# Patient Record
Sex: MALE | Race: WHITE | NOT HISPANIC OR LATINO | Employment: OTHER | URBAN - NONMETROPOLITAN AREA
[De-identification: names, ages, dates, MRNs, and addresses within clinical notes are randomized per-mention and may not be internally consistent; named-entity substitution may affect disease eponyms.]

---

## 2019-01-14 ENCOUNTER — OFFICE VISIT (OUTPATIENT)
Dept: UROLOGY | Facility: CLINIC | Age: 59
End: 2019-01-14

## 2019-01-14 VITALS — BODY MASS INDEX: 39.55 KG/M2 | WEIGHT: 252 LBS | HEIGHT: 67 IN

## 2019-01-14 DIAGNOSIS — N20.0 RENAL CALCULUS: ICD-10-CM

## 2019-01-14 DIAGNOSIS — K40.30 INGUINAL HERNIA OF RIGHT SIDE WITH OBSTRUCTION AND WITHOUT GANGRENE: ICD-10-CM

## 2019-01-14 DIAGNOSIS — N52.02 CORPORO-VENOUS OCCLUSIVE ERECTILE DYSFUNCTION: ICD-10-CM

## 2019-01-14 DIAGNOSIS — N42.9 DISORDER OF PROSTATE: ICD-10-CM

## 2019-01-14 DIAGNOSIS — R33.9 INCOMPLETE BLADDER EMPTYING: Primary | ICD-10-CM

## 2019-01-14 LAB — PSA SERPL-MCNC: 0.53 NG/ML (ref 0–4)

## 2019-01-14 PROCEDURE — 84153 ASSAY OF PSA TOTAL: CPT | Performed by: UROLOGY

## 2019-01-14 PROCEDURE — 99204 OFFICE O/P NEW MOD 45 MIN: CPT | Performed by: UROLOGY

## 2019-01-14 RX ORDER — SILDENAFIL CITRATE 20 MG/1
TABLET ORAL
Qty: 30 TABLET | Refills: 11 | Status: SHIPPED | OUTPATIENT
Start: 2019-01-14 | End: 2022-10-26 | Stop reason: HOSPADM

## 2019-01-14 NOTE — PROGRESS NOTES
Chief Complaint:          Chief Complaint   Patient presents with   • Lower abdominal pain and pressure       HPI:   58 y.o. male.  58-year-old white male accompanied by his wife.  He has lower abdominal pain and pressure he brought a disc which I personally reviewed showing bilateral punctate nephrolithiasis, in the upper middle lower pole calyces.  He has occasional right lower quadrant pain, no nausea, no vomiting, no fevers, no chills no medications.  He sees Dr. Romero.  He's had a cholecystectomy iand 3 hernias on the right all unsuccessful.  He's having significant right lower quadrant pain and wears a truss has erectile dysfunction and I gave him a prescription for Viagra after discussing the character and quality of his erections.  He inquired whether should have his hernia repaired for the fourth time and I indicated as long as he continues his very heavy job as a  he is at significant risk for recurrence    Past Medical History:        Past Medical History:   Diagnosis Date   • Kidney stone          Current Meds:     No current outpatient medications on file.     No current facility-administered medications for this visit.         Allergies:      No Known Allergies     Past Surgical History:     Past Surgical History:   Procedure Laterality Date   • CHOLECYSTECTOMY  20 years   • HERNIA REPAIR     • KIDNEY STONE SURGERY           Social History:     Social History     Socioeconomic History   • Marital status:      Spouse name: Not on file   • Number of children: Not on file   • Years of education: Not on file   • Highest education level: Not on file   Social Needs   • Financial resource strain: Not on file   • Food insecurity - worry: Not on file   • Food insecurity - inability: Not on file   • Transportation needs - medical: Not on file   • Transportation needs - non-medical: Not on file   Occupational History   • Not on file   Tobacco Use   • Smoking status: Former Smoker      Packs/day: 2.00     Types: Cigarettes   • Smokeless tobacco: Former User     Types: Snuff   Substance and Sexual Activity   • Alcohol use: No     Frequency: Never   • Drug use: No   • Sexual activity: Yes     Partners: Female     Birth control/protection: None   Other Topics Concern   • Not on file   Social History Narrative   • Not on file       Family History:     Family History   Problem Relation Age of Onset   • Cancer Father        Review of Systems:     Review of Systems   Constitutional: Negative.    HENT: Negative.    Eyes: Negative.    Respiratory: Negative.    Cardiovascular: Negative.    Gastrointestinal: Negative.    Endocrine: Negative.    Musculoskeletal: Negative.    Allergic/Immunologic: Negative.    Neurological: Negative.    Hematological: Negative.    Psychiatric/Behavioral: Negative.        Physical Exam:     Physical Exam   Constitutional: He is oriented to person, place, and time. He appears well-developed and well-nourished.   HENT:   Head: Normocephalic and atraumatic.   Eyes: Conjunctivae and EOM are normal. Pupils are equal, round, and reactive to light.   Neck: Normal range of motion.   Cardiovascular: Normal rate, regular rhythm, normal heart sounds and intact distal pulses.   Pulmonary/Chest: Effort normal and breath sounds normal.   Abdominal: Soft. Bowel sounds are normal.   Genitourinary:   Genitourinary Comments: Soft nontender abdomen with no organomegaly, rigidity, or tenderness.  He has normal external genitalia and uncircumcised phallus with a freely movable foreskin bilaterally descended testes without masses there is a right direct inguinal hernia, and no adenopathy or abnormalities he had good rectal tone and a large smooth firm prostate.  There is no nodularity or any suspicious rectal abnormalities     Musculoskeletal: Normal range of motion.   Neurological: He is alert and oriented to person, place, and time. He has normal reflexes.   Skin: Skin is warm and dry.    Psychiatric: He has a normal mood and affect. His behavior is normal. Judgment and thought content normal.   Nursing note and vitals reviewed.      I have reviewed the following portions of the patient's history: allergies, current medications, past family history, past medical history, past social history, past surgical history, problem list and ROS and confirm it's accurate.      Procedure:       Assessment/Plan:   Nephrolithiasis-he has bilateral punctate stones I recommended increased fluids as he does not have a great deal of fluid intake during the day  Erectile dysfunction-we discussed the anatomy and physiology of the penis and the endothelium.  We discussed the various forms of erectile dysfunction including peripheral vascular occlusive disease, postoperative, secondary to radiation treatments of the prostate, and arterial inflow.  We discussed the various treatment options available including oral medication and its various forms.  We discussed the use of both generic and non-generic Viagra.  We discussed Cialis and a longer half-life of 17 hours as well as the other 2 medications.  We discussed cost involved with this including the fact that the generic is much cheaper but is taken has multiple pills because they are 20 mg dosages.  We did discuss the other alternatives including Penile injections, vacuum erection devices and surgical intervention reserved for only the most severe cases.  We discussed the need for testosterone in about 20% of cases of erectile dysfunction.  He was given a prescription for Viagra  Recurrent right direct inguinal hernia-currently wearing a truss     Patient's Body mass index is 39.46 kg/m². BMI is above normal parameters. Recommendations include: educational material.          This document has been electronically signed by KAMALJIT DELACRUZ MD January 14, 2019 3:40 PM

## 2019-01-15 ENCOUNTER — TELEPHONE (OUTPATIENT)
Dept: UROLOGY | Facility: CLINIC | Age: 59
End: 2019-01-15

## 2019-01-15 PROBLEM — N20.0 RENAL CALCULUS: Status: ACTIVE | Noted: 2019-01-15

## 2019-01-15 PROBLEM — K40.30 INGUINAL HERNIA OF RIGHT SIDE WITH OBSTRUCTION AND WITHOUT GANGRENE: Status: ACTIVE | Noted: 2019-01-15

## 2019-01-15 PROBLEM — N52.02 CORPORO-VENOUS OCCLUSIVE ERECTILE DYSFUNCTION: Status: ACTIVE | Noted: 2019-01-15

## 2019-07-05 ENCOUNTER — OFFICE VISIT (OUTPATIENT)
Dept: UROLOGY | Facility: CLINIC | Age: 59
End: 2019-07-05

## 2019-07-05 VITALS — BODY MASS INDEX: 40.93 KG/M2 | HEIGHT: 67 IN | WEIGHT: 260.8 LBS

## 2019-07-05 DIAGNOSIS — N20.1 URETERAL CALCULUS: ICD-10-CM

## 2019-07-05 DIAGNOSIS — N20.0 RENAL CALCULUS: Primary | ICD-10-CM

## 2019-07-05 PROCEDURE — 99213 OFFICE O/P EST LOW 20 MIN: CPT | Performed by: UROLOGY

## 2019-07-05 RX ORDER — TAMSULOSIN HYDROCHLORIDE 0.4 MG/1
1 CAPSULE ORAL NIGHTLY
Qty: 30 CAPSULE | Refills: 5 | OUTPATIENT
Start: 2019-07-05 | End: 2022-10-23

## 2019-07-05 RX ORDER — KETOROLAC TROMETHAMINE 10 MG/1
10 TABLET, FILM COATED ORAL
Refills: 0 | Status: ON HOLD | COMMUNITY
Start: 2019-07-03 | End: 2022-10-26

## 2019-07-05 RX ORDER — OXYCODONE AND ACETAMINOPHEN 10; 325 MG/1; MG/1
1 TABLET ORAL EVERY 6 HOURS PRN
Qty: 12 TABLET | Refills: 0 | OUTPATIENT
Start: 2019-07-05 | End: 2022-10-23

## 2019-07-05 RX ORDER — TAMSULOSIN HYDROCHLORIDE 0.4 MG/1
0.4 CAPSULE ORAL
Refills: 0 | COMMUNITY
Start: 2019-07-03 | End: 2022-10-23

## 2019-07-05 NOTE — PROGRESS NOTES
Chief Complaint:          Chief Complaint   Patient presents with   • Nephrolithiasis     Er Follow up        HPI:   59 y.o. male  white male accompanied by his wife.  He has lower abdominal pain and pressure he brought a disc which I personally reviewed showing bilateral punctate nephrolithiasis, in the upper middle lower pole calyces.  He has occasional right lower quadrant pain, no nausea, no vomiting, no fevers, no chills no medications.  He sees Dr. Romero.  He's had a cholecystectomy iand 3 hernias on the right all unsuccessful.  He's having significant right lower quadrant pain and wears a truss has erectile dysfunction and I gave him a prescription for Viagra after discussing the character and quality of his erections.  He inquired whether should have his hernia repaired for the fourth time and I indicated as long as he continues his very heavy job as a  he is at significant risk for recurrence  Is a very tiny stone in the left UVJ with a greater than 99% chance of spontaneous passage I gave him pain medication nausea medication and Flomax for medical expulsive therapy I will see him back on Monday morning July 8      Past Medical History:        Past Medical History:   Diagnosis Date   • Kidney stone          Current Meds:     Current Outpatient Medications   Medication Sig Dispense Refill   • ketorolac (TORADOL) 10 MG tablet 10 mg.  0   • sildenafil (REVATIO) 20 MG tablet 1-5 by mouth one hour prior to intercourse on an empty stomach 30 tablet 11   • tamsulosin (FLOMAX) 0.4 MG capsule 24 hr capsule 0.4 mg.  0     No current facility-administered medications for this visit.         Allergies:      No Known Allergies     Past Surgical History:     Past Surgical History:   Procedure Laterality Date   • CHOLECYSTECTOMY  20 years   • HERNIA REPAIR     • KIDNEY STONE SURGERY           Social History:     Social History     Socioeconomic History   • Marital status:      Spouse name: Not on  file   • Number of children: Not on file   • Years of education: Not on file   • Highest education level: Not on file   Tobacco Use   • Smoking status: Former Smoker     Packs/day: 2.00     Types: Cigarettes   • Smokeless tobacco: Former User     Types: Snuff   Substance and Sexual Activity   • Alcohol use: No     Frequency: Never   • Drug use: No   • Sexual activity: Yes     Partners: Female     Birth control/protection: None       Family History:     Family History   Problem Relation Age of Onset   • Cancer Father        Review of Systems:     Review of Systems   Constitutional: Negative.    HENT: Negative.    Eyes: Negative.    Respiratory: Negative.    Cardiovascular: Negative.    Gastrointestinal: Negative.    Endocrine: Negative.    Genitourinary: Positive for frequency.   Musculoskeletal: Negative.    Allergic/Immunologic: Negative.    Neurological: Negative.    Hematological: Negative.    Psychiatric/Behavioral: Negative.        Physical Exam:     Physical Exam   Constitutional: He is oriented to person, place, and time. He appears well-developed and well-nourished.   HENT:   Head: Normocephalic and atraumatic.   Eyes: Conjunctivae and EOM are normal. Pupils are equal, round, and reactive to light.   Neck: Normal range of motion.   Cardiovascular: Normal rate, regular rhythm, normal heart sounds and intact distal pulses.   Pulmonary/Chest: Effort normal and breath sounds normal.   Abdominal: Soft. Bowel sounds are normal.   Musculoskeletal: Normal range of motion.   Neurological: He is alert and oriented to person, place, and time. He has normal reflexes.   Skin: Skin is warm and dry.   Psychiatric: He has a normal mood and affect. His behavior is normal. Judgment and thought content normal.   Nursing note and vitals reviewed.      I have reviewed the following portions of the patient's history: allergies, current medications, past family history, past medical history, past social history, past surgical  history, problem list and ROS and confirm it's accurate.      Procedure:       Assessment/Plan:   Ureteral calculus-patient has been diagnosed with a ureteral calculus.  We have discussed the various parameters regarding spontaneous passage including the notion that a 2 mm stone has a high likelihood of spontaneous passage versus a larger stone being caught up in the upper areas of the urinary tract.  We also discussed the medical management of stone disease and the use of medical expulsive therapy in the form of Flomax.  This is used in an off label setting.  We discussed the indicators for intervention including  absolute indicators such as sepsis and uncontrollable severe pain as well as  the relative indicators of moderate pain that is well-controlled with various analgesia.  I also talked about nonoperative management including ambulation and increasing fluids and hot tub as being an effective adjuncts in the treatment of a ureteral stone.            Patient's Body mass index is 40.85 kg/m². BMI is above normal parameters. Recommendations include: educational material.      Smoking Cessation Counseling:  Never a smoker.  Patient does not currently use any tobacco products.                   This document has been electronically signed by KAMALJIT DELACRUZ MD July 5, 2019 10:58 AM

## 2019-07-08 PROBLEM — N20.1 URETERAL CALCULUS: Status: ACTIVE | Noted: 2019-07-08

## 2019-12-03 ENCOUNTER — TRANSCRIBE ORDERS (OUTPATIENT)
Dept: ADMINISTRATIVE | Facility: HOSPITAL | Age: 59
End: 2019-12-03

## 2019-12-03 ENCOUNTER — LAB (OUTPATIENT)
Dept: LAB | Facility: HOSPITAL | Age: 59
End: 2019-12-03

## 2019-12-03 DIAGNOSIS — R10.31 ABDOMINAL PAIN, RIGHT LOWER QUADRANT: ICD-10-CM

## 2019-12-03 DIAGNOSIS — R10.31 ABDOMINAL PAIN, RIGHT LOWER QUADRANT: Primary | ICD-10-CM

## 2019-12-03 DIAGNOSIS — R10.31 RLQ ABDOMINAL PAIN: Primary | ICD-10-CM

## 2019-12-03 PROCEDURE — 84520 ASSAY OF UREA NITROGEN: CPT

## 2019-12-03 PROCEDURE — 36415 COLL VENOUS BLD VENIPUNCTURE: CPT

## 2019-12-03 PROCEDURE — 82565 ASSAY OF CREATININE: CPT

## 2019-12-04 LAB
BUN BLD-MCNC: 15 MG/DL (ref 6–20)
CREAT BLD-MCNC: 0.82 MG/DL (ref 0.76–1.27)
GFR SERPL CREATININE-BSD FRML MDRD: 97 ML/MIN/1.73

## 2019-12-05 ENCOUNTER — HOSPITAL ENCOUNTER (OUTPATIENT)
Dept: CT IMAGING | Facility: HOSPITAL | Age: 59
Discharge: HOME | End: 2019-12-05
Admitting: SURGERY

## 2019-12-05 DIAGNOSIS — R10.31 RLQ ABDOMINAL PAIN: ICD-10-CM

## 2019-12-05 PROCEDURE — 0 IOVERSOL 68 % SOLUTION: Performed by: SURGERY

## 2019-12-05 PROCEDURE — 74177 CT ABD & PELVIS W/CONTRAST: CPT | Performed by: RADIOLOGY

## 2019-12-05 PROCEDURE — 74177 CT ABD & PELVIS W/CONTRAST: CPT

## 2019-12-05 RX ADMIN — IOVERSOL 100 ML: 678 INJECTION INTRA-ARTERIAL; INTRAVENOUS at 08:57

## 2022-10-23 ENCOUNTER — HOSPITAL ENCOUNTER (EMERGENCY)
Facility: HOSPITAL | Age: 62
Discharge: HOME | End: 2022-10-23
Attending: STUDENT IN AN ORGANIZED HEALTH CARE EDUCATION/TRAINING PROGRAM | Admitting: STUDENT IN AN ORGANIZED HEALTH CARE EDUCATION/TRAINING PROGRAM

## 2022-10-23 ENCOUNTER — APPOINTMENT (OUTPATIENT)
Dept: CT IMAGING | Facility: HOSPITAL | Age: 62
End: 2022-10-23

## 2022-10-23 VITALS
RESPIRATION RATE: 20 BRPM | SYSTOLIC BLOOD PRESSURE: 93 MMHG | BODY MASS INDEX: 34.13 KG/M2 | HEIGHT: 72 IN | WEIGHT: 252 LBS | HEART RATE: 72 BPM | TEMPERATURE: 97 F | DIASTOLIC BLOOD PRESSURE: 74 MMHG | OXYGEN SATURATION: 100 %

## 2022-10-23 DIAGNOSIS — K76.0 HEPATIC STEATOSIS: ICD-10-CM

## 2022-10-23 DIAGNOSIS — K43.9 HERNIA OF ANTERIOR ABDOMINAL WALL: ICD-10-CM

## 2022-10-23 DIAGNOSIS — N20.0 NEPHROLITHIASIS: Primary | ICD-10-CM

## 2022-10-23 LAB
ALBUMIN SERPL-MCNC: 4.08 G/DL (ref 3.5–5.2)
ALBUMIN/GLOB SERPL: 1.5 G/DL
ALP SERPL-CCNC: 98 U/L (ref 39–117)
ALT SERPL W P-5'-P-CCNC: 40 U/L (ref 1–41)
ANION GAP SERPL CALCULATED.3IONS-SCNC: 12.5 MMOL/L (ref 5–15)
AST SERPL-CCNC: 24 U/L (ref 1–40)
BACTERIA UR QL AUTO: ABNORMAL /HPF
BASOPHILS # BLD AUTO: 0.08 10*3/MM3 (ref 0–0.2)
BASOPHILS NFR BLD AUTO: 0.9 % (ref 0–1.5)
BILIRUB SERPL-MCNC: 0.2 MG/DL (ref 0–1.2)
BILIRUB UR QL STRIP: NEGATIVE
BUN SERPL-MCNC: 14 MG/DL (ref 8–23)
BUN/CREAT SERPL: 14.4 (ref 7–25)
CALCIUM SPEC-SCNC: 9.3 MG/DL (ref 8.6–10.5)
CHLORIDE SERPL-SCNC: 103 MMOL/L (ref 98–107)
CLARITY UR: ABNORMAL
CO2 SERPL-SCNC: 25.5 MMOL/L (ref 22–29)
COLOR UR: ABNORMAL
CREAT SERPL-MCNC: 0.97 MG/DL (ref 0.76–1.27)
CRP SERPL-MCNC: <0.3 MG/DL (ref 0–0.5)
DEPRECATED RDW RBC AUTO: 39.6 FL (ref 37–54)
EGFRCR SERPLBLD CKD-EPI 2021: 89.4 ML/MIN/1.73
EOSINOPHIL # BLD AUTO: 0.26 10*3/MM3 (ref 0–0.4)
EOSINOPHIL NFR BLD AUTO: 2.8 % (ref 0.3–6.2)
ERYTHROCYTE [DISTWIDTH] IN BLOOD BY AUTOMATED COUNT: 12 % (ref 12.3–15.4)
GLOBULIN UR ELPH-MCNC: 2.7 GM/DL
GLUCOSE SERPL-MCNC: 171 MG/DL (ref 65–99)
GLUCOSE UR STRIP-MCNC: NEGATIVE MG/DL
HCT VFR BLD AUTO: 42.2 % (ref 37.5–51)
HGB BLD-MCNC: 14.9 G/DL (ref 13–17.7)
HGB UR QL STRIP.AUTO: NEGATIVE
HOLD SPECIMEN: NORMAL
HOLD SPECIMEN: NORMAL
HYALINE CASTS UR QL AUTO: ABNORMAL /LPF
IMM GRANULOCYTES # BLD AUTO: 0.02 10*3/MM3 (ref 0–0.05)
IMM GRANULOCYTES NFR BLD AUTO: 0.2 % (ref 0–0.5)
KETONES UR QL STRIP: ABNORMAL
LEUKOCYTE ESTERASE UR QL STRIP.AUTO: ABNORMAL
LYMPHOCYTES # BLD AUTO: 2.37 10*3/MM3 (ref 0.7–3.1)
LYMPHOCYTES NFR BLD AUTO: 25.6 % (ref 19.6–45.3)
MCH RBC QN AUTO: 31.9 PG (ref 26.6–33)
MCHC RBC AUTO-ENTMCNC: 35.3 G/DL (ref 31.5–35.7)
MCV RBC AUTO: 90.4 FL (ref 79–97)
MONOCYTES # BLD AUTO: 0.76 10*3/MM3 (ref 0.1–0.9)
MONOCYTES NFR BLD AUTO: 8.2 % (ref 5–12)
NEUTROPHILS NFR BLD AUTO: 5.76 10*3/MM3 (ref 1.7–7)
NEUTROPHILS NFR BLD AUTO: 62.3 % (ref 42.7–76)
NITRITE UR QL STRIP: NEGATIVE
NRBC BLD AUTO-RTO: 0 /100 WBC (ref 0–0.2)
PH UR STRIP.AUTO: 6.5 [PH] (ref 5–8)
PLATELET # BLD AUTO: 262 10*3/MM3 (ref 140–450)
PMV BLD AUTO: 9.4 FL (ref 6–12)
POTASSIUM SERPL-SCNC: 4 MMOL/L (ref 3.5–5.2)
PROT SERPL-MCNC: 6.8 G/DL (ref 6–8.5)
PROT UR QL STRIP: NEGATIVE
RBC # BLD AUTO: 4.67 10*6/MM3 (ref 4.14–5.8)
RBC # UR STRIP: ABNORMAL /HPF
REF LAB TEST METHOD: ABNORMAL
SODIUM SERPL-SCNC: 141 MMOL/L (ref 136–145)
SP GR UR STRIP: >=1.03 (ref 1–1.03)
SQUAMOUS #/AREA URNS HPF: ABNORMAL /HPF
UROBILINOGEN UR QL STRIP: ABNORMAL
WBC # UR STRIP: ABNORMAL /HPF
WBC NRBC COR # BLD: 9.25 10*3/MM3 (ref 3.4–10.8)
WHOLE BLOOD HOLD COAG: NORMAL
WHOLE BLOOD HOLD SPECIMEN: NORMAL

## 2022-10-23 PROCEDURE — 96375 TX/PRO/DX INJ NEW DRUG ADDON: CPT

## 2022-10-23 PROCEDURE — 96374 THER/PROPH/DIAG INJ IV PUSH: CPT

## 2022-10-23 PROCEDURE — 25010000002 KETOROLAC TROMETHAMINE PER 15 MG: Performed by: STUDENT IN AN ORGANIZED HEALTH CARE EDUCATION/TRAINING PROGRAM

## 2022-10-23 PROCEDURE — 25010000002 MORPHINE PER 10 MG

## 2022-10-23 PROCEDURE — 86140 C-REACTIVE PROTEIN: CPT | Performed by: STUDENT IN AN ORGANIZED HEALTH CARE EDUCATION/TRAINING PROGRAM

## 2022-10-23 PROCEDURE — 81001 URINALYSIS AUTO W/SCOPE: CPT | Performed by: STUDENT IN AN ORGANIZED HEALTH CARE EDUCATION/TRAINING PROGRAM

## 2022-10-23 PROCEDURE — 99283 EMERGENCY DEPT VISIT LOW MDM: CPT

## 2022-10-23 PROCEDURE — 80053 COMPREHEN METABOLIC PANEL: CPT | Performed by: STUDENT IN AN ORGANIZED HEALTH CARE EDUCATION/TRAINING PROGRAM

## 2022-10-23 PROCEDURE — 85025 COMPLETE CBC W/AUTO DIFF WBC: CPT | Performed by: STUDENT IN AN ORGANIZED HEALTH CARE EDUCATION/TRAINING PROGRAM

## 2022-10-23 PROCEDURE — 25010000002 ONDANSETRON PER 1 MG

## 2022-10-23 PROCEDURE — 74176 CT ABD & PELVIS W/O CONTRAST: CPT

## 2022-10-23 RX ORDER — KETOROLAC TROMETHAMINE 30 MG/ML
15 INJECTION, SOLUTION INTRAMUSCULAR; INTRAVENOUS ONCE
Status: COMPLETED | OUTPATIENT
Start: 2022-10-23 | End: 2022-10-23

## 2022-10-23 RX ORDER — ONDANSETRON 4 MG/1
4 TABLET, ORALLY DISINTEGRATING ORAL EVERY 8 HOURS PRN
Qty: 15 TABLET | Refills: 0 | Status: SHIPPED | OUTPATIENT
Start: 2022-10-23 | End: 2022-11-07

## 2022-10-23 RX ORDER — KETOROLAC TROMETHAMINE 10 MG/1
10 TABLET, FILM COATED ORAL EVERY 6 HOURS PRN
Qty: 15 TABLET | Refills: 0 | Status: SHIPPED | OUTPATIENT
Start: 2022-10-23

## 2022-10-23 RX ORDER — HYDROCODONE BITARTRATE AND ACETAMINOPHEN 10; 325 MG/1; MG/1
1 TABLET ORAL EVERY 6 HOURS PRN
Qty: 10 TABLET | Refills: 0 | Status: SHIPPED | OUTPATIENT
Start: 2022-10-23

## 2022-10-23 RX ORDER — TAMSULOSIN HYDROCHLORIDE 0.4 MG/1
1 CAPSULE ORAL DAILY
Qty: 14 CAPSULE | Refills: 0 | Status: SHIPPED | OUTPATIENT
Start: 2022-10-23 | End: 2022-11-03 | Stop reason: SDUPTHER

## 2022-10-23 RX ORDER — ONDANSETRON 2 MG/ML
INJECTION INTRAMUSCULAR; INTRAVENOUS
Status: COMPLETED
Start: 2022-10-23 | End: 2022-10-23

## 2022-10-23 RX ORDER — ONDANSETRON 2 MG/ML
4 INJECTION INTRAMUSCULAR; INTRAVENOUS ONCE
Status: COMPLETED | OUTPATIENT
Start: 2022-10-23 | End: 2022-10-23

## 2022-10-23 RX ADMIN — ONDANSETRON 4 MG: 2 INJECTION INTRAMUSCULAR; INTRAVENOUS at 20:58

## 2022-10-23 RX ADMIN — SODIUM CHLORIDE 1000 ML: 9 INJECTION, SOLUTION INTRAVENOUS at 20:56

## 2022-10-23 RX ADMIN — MORPHINE SULFATE 4 MG: 4 INJECTION, SOLUTION INTRAMUSCULAR; INTRAVENOUS at 20:56

## 2022-10-23 RX ADMIN — KETOROLAC TROMETHAMINE 15 MG: 30 INJECTION, SOLUTION INTRAMUSCULAR at 21:12

## 2022-10-23 RX ADMIN — Medication 4 MG: at 20:56

## 2022-10-24 ENCOUNTER — OFFICE VISIT (OUTPATIENT)
Dept: UROLOGY | Facility: CLINIC | Age: 62
End: 2022-10-24

## 2022-10-24 ENCOUNTER — PRE-ADMISSION TESTING (OUTPATIENT)
Dept: PREADMISSION TESTING | Facility: HOSPITAL | Age: 62
End: 2022-10-24

## 2022-10-24 VITALS — HEIGHT: 72 IN | WEIGHT: 252 LBS | BODY MASS INDEX: 34.13 KG/M2

## 2022-10-24 DIAGNOSIS — R10.9 FLANK PAIN: ICD-10-CM

## 2022-10-24 DIAGNOSIS — N20.0 RIGHT NEPHROLITHIASIS: ICD-10-CM

## 2022-10-24 DIAGNOSIS — N20.1 RIGHT URETERAL STONE: Primary | ICD-10-CM

## 2022-10-24 PROCEDURE — 99204 OFFICE O/P NEW MOD 45 MIN: CPT | Performed by: NURSE PRACTITIONER

## 2022-10-24 RX ORDER — GENTAMICIN SULFATE 80 MG/100ML
80 INJECTION, SOLUTION INTRAVENOUS ONCE
Status: CANCELLED | OUTPATIENT
Start: 2022-10-26 | End: 2022-10-24

## 2022-10-24 NOTE — ED PROVIDER NOTES
[facility]  emergency department encounter        Pt Name: Wil Barone  MRN: [id number]  Birthdate 1960  Date of evaluation: 10/23/2022    Chief Complaint   Patient presents with   • Abdominal Pain             HISTORY OF PRESENT ILLNESS:   Wil Barone is a 62 y.o. male PMH nephrolithiasis, otherwise broadly denies medical issues.    Patient presents complaining of right lower abdominal and flank pain new onset yesterday, constant, progressively worsening.  Pain constant with colicky exacerbations, unable to find comfortable position.  Endorses chills, nausea without vomiting.  Endorses dysuria without hematuria.  Patient has had numerous stones that he has passed spontaneously though has had operative intervention and a previous stone.        No other exacerbating or alleviating factors other than as noted above.  Severity: Severe    PCP: Biju Romero MD          REVIEW OF SYSTEMS:     Review of Systems   Constitutional: Positive for chills. Negative for fever.   HENT: Negative for congestion and rhinorrhea.    Eyes: Negative for visual disturbance.   Respiratory: Negative for cough and shortness of breath.    Cardiovascular: Negative for chest pain.   Gastrointestinal: Positive for abdominal pain and nausea. Negative for vomiting.   Genitourinary: Positive for dysuria and flank pain.   Musculoskeletal: Negative for myalgias.   Skin: Negative for rash.   Neurological: Negative for headaches.   Psychiatric/Behavioral: Negative for confusion.       Review of systems otherwise as per HPI.          PREVIOUS HISTORY:         Past Medical History:   Diagnosis Date   • Kidney stone            Past Surgical History:   Procedure Laterality Date   • CHOLECYSTECTOMY  20 years   • HERNIA REPAIR     • KIDNEY STONE SURGERY             Social History     Socioeconomic History   • Marital status:    Tobacco Use   • Smoking status: Former     Packs/day: 2.00     Types: Cigarettes   • Smokeless  tobacco: Former     Types: Snuff   Substance and Sexual Activity   • Alcohol use: No   • Drug use: No   • Sexual activity: Yes     Partners: Female     Birth control/protection: None           Family History   Problem Relation Age of Onset   • Cancer Father          Current Outpatient Medications   Medication Instructions   • HYDROcodone-acetaminophen (NORCO)  MG per tablet 1 tablet, Oral, Every 6 Hours PRN   • ketorolac (TORADOL) 10 mg   • ketorolac (TORADOL) 10 mg, Oral, Every 6 Hours PRN   • ondansetron ODT (ZOFRAN-ODT) 4 mg, Translingual, Every 8 Hours PRN   • sildenafil (REVATIO) 20 MG tablet 1-5 by mouth one hour prior to intercourse on an empty stomach   • tamsulosin (FLOMAX) 0.4 mg, Oral, Daily         Allergies:  Patient has no known allergies.    Medications, allergies and past medical, surgical, family, and social history reviewed.        PHYSICAL EXAM:     Physical Exam  Vitals and nursing note reviewed.   Constitutional:       General: He is not in acute distress.     Appearance: Normal appearance.   HENT:      Head: Normocephalic and atraumatic.   Eyes:      Extraocular Movements: Extraocular movements intact.      Conjunctiva/sclera: Conjunctivae normal.   Pulmonary:      Effort: Pulmonary effort is normal. No respiratory distress.   Abdominal:      General: Abdomen is flat. There is no distension.      Palpations: Abdomen is soft.      Tenderness: There is no abdominal tenderness. There is no right CVA tenderness, left CVA tenderness or guarding.   Musculoskeletal:         General: No deformity. Normal range of motion.      Cervical back: Normal range of motion. No rigidity.   Neurological:      General: No focal deficit present.      Mental Status: He is alert and oriented to person, place, and time.   Psychiatric:         Mood and Affect: Mood normal.         Behavior: Behavior normal.             COMPLETED DIAGNOSTIC STUDIES AND INTERVENTIONS:     Lab Results (last 24 hours)     Procedure  Component Value Units Date/Time    CBC & Differential [[id number]]  (Abnormal) Collected: 10/23/22 2047    Specimen: Blood Updated: 10/23/22 2053    Narrative:      The following orders were created for panel order CBC & Differential.  Procedure                               Abnormality         Status                     ---------                               -----------         ------                     CBC Auto Differential[[id number]]        Abnormal            Final result                 Please view results for these tests on the individual orders.    Comprehensive Metabolic Panel [[id number]]  (Abnormal) Collected: 10/23/22 2047    Specimen: Blood Updated: 10/23/22 2133     Glucose 171 mg/dL      BUN 14 mg/dL      Creatinine 0.97 mg/dL      Sodium 141 mmol/L      Potassium 4.0 mmol/L      Comment: Specimen hemolyzed.  Results may be affected. 2+ Hemolysis             Chloride 103 mmol/L      CO2 25.5 mmol/L      Calcium 9.3 mg/dL      Total Protein 6.8 g/dL      Albumin 4.08 g/dL      ALT (SGPT) 40 U/L      Comment: Specimen hemolyzed.  Results may be affected.        AST (SGOT) 24 U/L      Alkaline Phosphatase 98 U/L      Total Bilirubin 0.2 mg/dL      Globulin 2.7 gm/dL      A/G Ratio 1.5 g/dL      BUN/Creatinine Ratio 14.4     Anion Gap 12.5 mmol/L      eGFR 89.4 mL/min/1.73      Comment: National Kidney Foundation and American Society of Nephrology (ASN) Task Force recommended calculation based on the Chronic Kidney Disease Epidemiology Collaboration (CKD-EPI) equation refit without adjustment for race.       Narrative:      GFR Normal >60  Chronic Kidney Disease <60  Kidney Failure <15      C-reactive Protein [[id number]]  (Normal) Collected: 10/23/22 2047    Specimen: Blood Updated: 10/23/22 2117     C-Reactive Protein <0.30 mg/dL     CBC Auto Differential [[id number]]  (Abnormal) Collected: 10/23/22 2047    Specimen: Blood Updated: 10/23/22 2053     WBC 9.25 10*3/mm3      RBC 4.67 10*6/mm3      Hemoglobin  14.9 g/dL      Hematocrit 42.2 %      MCV 90.4 fL      MCH 31.9 pg      MCHC 35.3 g/dL      RDW 12.0 %      RDW-SD 39.6 fl      MPV 9.4 fL      Platelets 262 10*3/mm3      Neutrophil % 62.3 %      Lymphocyte % 25.6 %      Monocyte % 8.2 %      Eosinophil % 2.8 %      Basophil % 0.9 %      Immature Grans % 0.2 %      Neutrophils, Absolute 5.76 10*3/mm3      Lymphocytes, Absolute 2.37 10*3/mm3      Monocytes, Absolute 0.76 10*3/mm3      Eosinophils, Absolute 0.26 10*3/mm3      Basophils, Absolute 0.08 10*3/mm3      Immature Grans, Absolute 0.02 10*3/mm3      nRBC 0.0 /100 WBC     Urinalysis With Microscopic If Indicated (No Culture) - Urine, Clean Catch [[id number]]  (Abnormal) Collected: 10/23/22 2123    Specimen: Urine, Clean Catch Updated: 10/23/22 2142     Color, UA Dark Yellow     Appearance, UA Cloudy     pH, UA 6.5     Specific Gravity, UA >=1.030     Glucose, UA Negative     Ketones, UA 15 mg/dL (1+)     Bilirubin, UA Negative     Blood, UA Negative     Protein, UA Negative     Leuk Esterase, UA Trace     Nitrite, UA Negative     Urobilinogen, UA 1.0 E.U./dL    Urinalysis, Microscopic Only - Urine, Clean Catch [[id number]]  (Abnormal) Collected: 10/23/22 2123    Specimen: Urine, Clean Catch Updated: 10/23/22 2142     RBC, UA 6-12 /HPF      WBC, UA 3-5 /HPF      Bacteria, UA None Seen /HPF      Squamous Epithelial Cells, UA 0-2 /HPF      Hyaline Casts, UA 3-6 /LPF      Methodology Automated Microscopy            CT Abdomen Pelvis Stone Protocol   Final Result   5 mm proximal right ureteral stone with moderate right-sided hydronephrosis.      Nonobstructing right and left intrarenal stones.      Diffuse hepatic steatosis.      Lower midline abdominal wall hernia which appears to contain omental fat, several loops of small bowel and possibly the anterior aspect of the bladder.               Signer Name: OWEN Danielson MD    Signed: 10/23/2022 9:47 PM    Workstation Name: [facility]     [facility]            New Medications Ordered This Visit   Medications   • sodium chloride 0.9 % bolus 1,000 mL   • ondansetron (ZOFRAN) injection 4 mg   • ketorolac (TORADOL) injection 15 mg   • morphine injection 4 mg   • morphine 4 MG/ML injection  - ADS Override Pull     Created by cabinet override   • ondansetron (ZOFRAN) 2 mg/mL injection  - ADS Override Pull     Created by cabinet override   • tamsulosin (FLOMAX) 0.4 MG capsule 24 hr capsule     Sig: Take 1 capsule by mouth Daily for 14 days.     Dispense:  14 capsule     Refill:  0   • ketorolac (TORADOL) 10 MG tablet     Sig: Take 1 tablet by mouth Every 6 (Six) Hours As Needed for Moderate Pain for up to 15 doses.     Dispense:  15 tablet     Refill:  0   • ondansetron ODT (ZOFRAN-ODT) 4 MG disintegrating tablet     Sig: Place 1 tablet on the tongue Every 8 (Eight) Hours As Needed for Nausea or Vomiting for up to 15 days.     Dispense:  15 tablet     Refill:  0   • HYDROcodone-acetaminophen (NORCO)  MG per tablet     Sig: Take 1 tablet by mouth Every 6 (Six) Hours As Needed for Moderate Pain for up to 10 doses.     Dispense:  10 tablet     Refill:  0         Procedures            MEDICAL DECISION-MAKING AND ED COURSE:     ED Course as of 10/23/22 2202   Sun Oct 23, 2022   2131 MDM: 62-year-old male with history of nephrolithiasis presents with right lower quadrant and right lower flank pain similar to prior stones.  Pain characteristic of stones.  This patient is previously required operative intervention, will obtain CT imaging, treat with fluids, Toradol, morphine and Zofran. [KP]   2132 WBC: 9.25 [KP]   2132 C-Reactive Protein: <0.30 [KP]   2132 Hemoglobin: 14.9 [KP]   2155 CT Abdomen Pelvis Stone Protocol  5 mm proximal right ureteral stone with moderate right-sided hydronephrosis.     Nonobstructing right and left intrarenal stones.     Diffuse hepatic steatosis.     Lower midline abdominal wall hernia which appears to contain omental fat,  several loops of small bowel and possibly the anterior aspect of the bladder. [KP]   2155 WBC, UA(!): 3-5 [KP]   2155 Bacteria, UA: None Seen [KP]   2155 Nitrite, UA: Negative [KP]   2155 Creatinine: 0.97 [KP]   2155 WBC: 9.25 [KP]   2155 C-Reactive Protein: <0.30 [KP]   2201 Patient reports feeling significantly better.  Prescribe tamsulosin, Toradol, hydrocodone for breakthrough pain.  Instructed on drowsiness precautions, no driving and the/addictive potential.  Patient to follow-up with Dr. Moya in 1 week or return here for new onset or worsening symptoms.  Patient agreeable plan, all questions answered. [KP]      ED Course User Index  [KP] Mak Nichole MD       ?      FINAL IMPRESSION:       1. Nephrolithiasis    2. Hernia of anterior abdominal wall    3. Hepatic steatosis         The complaints listed here are new problems to this examiner.      FOLLOW-UP  [facility] Emergency Department  [address] [id number] [phone]    If symptoms worsen    Holland Moya MD  [address]  [address]  [place] [address]  [phone]    Schedule an appointment as soon as possible for a visit in 1 week        DISPOSITION  ED Disposition     ED Disposition   Discharge    Condition   Stable    Comment   --                   This care is provided during an unprecedented national emergency due to the Novel Coronavirus (COVID-19). COVID-19 infections and transmission risks place heavy strains on healthcare resources. As this pandemic evolves, the Hospital and providers strive to respond fluidly, to remain operational, and to provide care relative to available resources and information. Outcomes are unpredictable and treatments are without well-defined guidelines. Further, the impact of COVID-19 on all aspects of emergency care, including the impact to patients seeking care for reasons other than COVID-19, is unavoidable during this national emergency.    This note was dictated using  a [id number] tool. Occasional wrong-word or 'sound-a-like' substitutions may have occurred due to the inherent limitations of voice recognition software. ?Read the chart carefully and recognize, using context, where substitutions have occurred.    Mak Nichole MD  22:02 EDT  10/23/2022             Mak Nichole MD  10/23/22 1578

## 2022-10-24 NOTE — PROGRESS NOTES
Chief Complaint  RIGHT NEPHROLITHIASIS /FLANK PAIN (NEW PATIENT/ER FOLLOW UP WITH RIGHT UPJ STONE/HEMATURIA)    Ney Travis presents to [facility] for RENAL STONES/FLANK PAIN  History of Present Illness    Mr. Wil Travis  is a pleasant 62-year-old male with a significant history of recurrent kidney stones, who presents to clinic today for evaluation accompanied by his wife Reny. He is an ER follow-up for renal stones. Patient apparently presented to the ER 2 days ago on 10/22/2022 with complaints of 10 on 10, very consistent right flank pain radiating to right lower quadrant of his abdomen and suprapubic region. During the ER visit, he had reported significant discomfort with colicky pain, testalgia, urinary symptoms, frequency, urgency. He reported increased difficulty finding any comfortable sitting position.     Due to his significant history of recurrent kidney stones, he had a CT abdomen and pelvis done which shows a small, less than 5 mm right lower lobe lung nodule that is unchanged from prior CTs. There is diffuse hepatic steatosis on the CT, but most significantly is a 5 mm proximal right ureteral stone causing mild to moderate right-sided hydronephrosis. On CT, patient has an additional 5 mm right intrarenal stone, which is punctate and non obstructing.     CT also shows a significant omental hernia. The patient had a prior cholecystectomy in the past. He has had three hernias surgeries on the right, all unsuccessful post surgery, and I believe this might be contributing to his abdominal pain. 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     Nevertheless, we are going to evaluate him for his kidney stones today and definitive plan of care. He reports that he has a kidney stone that is causing him problems and he would like to have it  "removed. The patient reports that he is still in pain despite taking pain medication. He states that he is still experiencing some burning with urination. He denies urinary frequency. The patient has been experiencing nausea. His wife reports that the patient has vomited 4 or 5 times this morning. He is in apparent discomfort and SEEN lying down on exam table.     He reports he has been miserable with significant discomfort 7/10.  He reports right greater than left flank pain that is still very colicky and has been constant. He has right lower quadrant pain, significant pressure/discomfort still in his  right lower abdomen, flank area and pelvic region. He still has nausea, WITH vomiting, denies chills or fevers. He reports urinary symptoms of frequency, urgency, and dysuria.  He has a frequent urge to void, but he is unable to urinate at this time.      Patient has been scheduled for right ureteroscopy, laser lithotripsy with possible right stent by Dr. Moya on Wednesday, 10/26/2022    Objective   Vital Signs:   Ht 182.9 cm (72.01\")   Wt 114 kg (252 lb)   BMI 34.17 kg/m²       ROS:   Review of Systems   Constitutional: Positive for activity change and fatigue. Negative for appetite change, chills, diaphoresis, fever, unexpected weight gain and unexpected weight loss.   HENT: Negative for congestion, ear discharge, ear pain, nosebleeds, rhinorrhea, sinus pressure and sore throat.    Eyes: Negative for blurred vision, double vision, photophobia, pain, redness and visual disturbance.   Respiratory: Negative for apnea, cough, chest tightness, shortness of breath, wheezing and stridor.    Cardiovascular: Negative for chest pain, palpitations and leg swelling.   Gastrointestinal: Positive for abdominal pain, nausea and vomiting. Negative for abdominal distention, constipation and diarrhea.   Endocrine: Negative for polydipsia, polyphagia and polyuria.   Genitourinary: Positive for decreased urine volume, difficulty " urinating, dysuria, flank pain and hematuria. Negative for discharge, frequency, genital sores, penile pain, penile swelling, scrotal swelling, testicular pain, urgency and urinary incontinence.   Musculoskeletal: Positive for back pain and myalgias. Negative for arthralgias and joint swelling.   Skin: Negative for pallor, rash and wound.   Neurological: Negative for dizziness, tremors, syncope, weakness, light-headedness, headache, memory problem and confusion.   Psychiatric/Behavioral: Positive for stress. Negative for agitation, behavioral problems and decreased concentration.        Physical Exam  Constitutional:       General: He is in acute distress.      Appearance: He is well-developed. He is obese.   HENT:      Head: Normocephalic and atraumatic.      Right Ear: External ear normal.      Left Ear: External ear normal.   Eyes:      General:         Right eye: No discharge.         Left eye: No discharge.      Conjunctiva/sclera: Conjunctivae normal.      Pupils: Pupils are equal, round, and reactive to light.   Neck:      Thyroid: No thyromegaly.      Trachea: No tracheal deviation.   Cardiovascular:      Rate and Rhythm: Normal rate and regular rhythm.      Heart sounds: No murmur heard.    No friction rub.   Pulmonary:      Effort: Pulmonary effort is normal. No respiratory distress.      Breath sounds: Normal breath sounds. No stridor.   Abdominal:      General: Bowel sounds are normal. There is distension.      Palpations: Abdomen is soft.      Tenderness: There is abdominal tenderness. There is right CVA tenderness and guarding.   Genitourinary:     Penis: Normal and uncircumcised. No tenderness or discharge.       Testes: Normal.      Rectum: Normal. Guaiac result negative.      Comments: URINE FREQUENCY  Musculoskeletal:         General: Tenderness present. No deformity. Normal range of motion.      Cervical back: Normal range of motion and neck supple. Tenderness present.   Skin:     General: Skin is  warm and dry.      Capillary Refill: Capillary refill takes less than 2 seconds.      Coloration: Skin is pale.   Neurological:      Mental Status: He is alert and oriented to person, place, and time.      Cranial Nerves: No cranial nerve deficit.      Motor: Weakness present.      Coordination: Coordination normal.   Psychiatric:         Behavior: Behavior normal.         Thought Content: Thought content normal.         Judgment: Judgment normal.        Result Review :     UA    Urinalysis 10/23/22 10/23/22    2123 2123   Squamous Epithelial Cells, UA  0-2   Specific Gravity, UA >=1.030    Ketones, UA 15 mg/dL (1+) (A)    Blood, UA Negative    Leukocytes, UA Trace (A)    Nitrite, UA Negative    RBC, UA  6-12 (A)   WBC, UA  3-5 (A)   Bacteria, UA  None Seen   (A) Abnormal value                     Assessment and Plan    Problem List Items Addressed This Visit        Gastrointestinal Abdominal     Flank pain    Overview     Added automatically from request for surgery [id number]         Relevant Orders    Case Request (Completed)   Other Visit Diagnoses     Right ureteral stone    -  Primary    Relevant Orders    Case Request (Completed)    Right nephrolithiasis        The patient will plan to have a right ureteroscopy, laser lithotripsy with possible stent placement with Dr. Moya.    Relevant Orders    Case Request (Completed)        Right Renal Calculus/RIGHT 5 MM UPJ STONE: The Patient has been diagnosed with a right 5 mm obstructing UPJ stone. He presents with right colicky pain that has been intermittent occasionally sharp with pelvic pressure and discomfort ongoing for 2 weeks.  Reports significant nausea with vomiting greater than 5 times.    We have discussed the various parameters regarding spontaneous passage including the notion that a tiny 1-4 mm stone has a high 95% likelihood of spontaneous passage versus a larger stone of > 5mm like he has  being caught up in the upper areas of the urinary tract.    We  also discussed the medical management of stone disease and the use of medical expulsive therapy in the form of Flomax. This is used in an off label setting. I also talked about nonoperative management including ambulation and increasing fluids and hot tub as being an effective adjuncts in the treatment of a stones.     We discussed the presence of the stone. We discussed the various therapeutic options available including percutaneous nephrostolithotomy, lithotripsy.  We discussed the risks of lithotripsy including the passage of stones the development of a large string of stones in the distal ureter known as Steinstrasse.  In the 3% incidence of that we will need to proceed with a ureteroscopy for obstructing fragments.  Extremely rare incidence of renal hematoma, and the significance of this.  We discussed the absolute relative indicators for intervention including the presence of sepsis, and pain we cannot control as the primary need for urgent intervention.  We discussed placement of a stent if indicated and the management of the stent as well.       PLAN  I discussed this plan of care with Dr. Moya and he is agreeable.    Patient has been scheduled for Right Uteroscopy Laser LIthotripsy with Possible Right Stent on Wednesday, 10/26/2022 with Dr. Moya.    He has adequate narcotic pain medication hydrocodone given in ER, and Toradol, and nausea medicine for severe pain and discomfort     We discussed treatment options for her flank pain with patient encouraged to continue conservative therapy alternating NSAID/Tylenol as tolerated, Also including hot baths, showers, warm compresses to lower back as tolerated. Also encouraged walking, physical therapy, light exercises as tolerated    Rest is the most important treatment in the case of flank pain. Rest and physical therapy are enough to improve minor pain. Discussed to monitor her daily routine with prevention of flank pain by: At least Drinking 8 glasses of  water per day, Limiting your alcohol consumption.  Having a healthy diet containing fruits, vegetables, and a lot of fluids, Practicing safe sex.  Also maintaining proper hygiene of your body as well as the environment.    He will follow-up in clinic postprocedure,     He may return sooner if need be,   OR     GO To to ER if any worsening symptoms.    Patient/wife agreeable plan of care.    Patient reports that he is not currently experiencing any symptoms of urinary incontinence.    BMI is >= 30 and <35. (Class 1 Obesity). The following options were offered after discussion;: weight loss educational material (shared in after visit summary), exercise counseling/recommendations and nutrition counseling/recommendations    RADIOLOGY (CT AND/OR KUB):    CT Abdomen and Pelvis: No results found for this or any previous visit.     CT Stone Protocol: Results for orders placed during the hospital encounter of 10/23/22    CT Abdomen Pelvis Stone Protocol    Narrative  CT Abdomen Pelvis WO    INDICATION:  Right lower quadrant pain.    TECHNIQUE:  CT of the abdomen and pelvis without IV contrast. Coronal and sagittal reconstructions were obtained.  Radiation dose reduction techniques included automated exposure control or exposure modulation based on body size. Count of known CT and cardiac nuc  med studies performed in previous 12 months: 0.    COMPARISON:  CT abdomen pelvis 12/5/2019    FINDINGS:  Abdomen: Small less than 5 mm right lower lobe lung nodule unchanged. Diffuse hepatic steatosis. Postcholecystectomy. The spleen appears normal. Pancreas and adrenal glands unremarkable.    5 mm proximal right ureteral stone with mild to moderate right-sided hydronephrosis. Additional 5 mm right intrarenal stone. Punctate nonobstructing left renal stone.    Colonic diverticulosis. Stomach and small bowel unremarkable. The appendix appears normal. Aortic atherosclerotic changes without aneurysm.    Pelvis: Bladder nondistended.  Prostate unremarkable. Ventral hernia lower abdominal wall containing omental fat, several loops of small bowel and the anterior aspect of the bladder.    Impression  5 mm proximal right ureteral stone with moderate right-sided hydronephrosis.    Nonobstructing right and left intrarenal stones.    Diffuse hepatic steatosis.    Lower midline abdominal wall hernia which appears to contain omental fat, several loops of small bowel and possibly the anterior aspect of the bladder.    Signer Name: OWEN Danielson MD  Signed: 10/23/2022 9:47 PM  Workstation Name: [email or web address]  [facility]     KUB: No results found for this or any previous visit.       LABS (3 MONTHS):    Admission on 10/23/2022, Discharged on 10/23/2022   Component Date Value Ref Range Status   • Extra Tube 10/23/2022 Hold for add-ons.   Final    Auto resulted.   • Extra Tube 10/23/2022 hold for add-on   Final    Auto resulted   • Extra Tube 10/23/2022 Hold for add-ons.   Final    Auto resulted.   • Extra Tube 10/23/2022 Hold for add-ons.   Final    Auto resulted   • Glucose 10/23/2022 171 (H)  65 - 99 mg/dL Final   • BUN 10/23/2022 14  8 - 23 mg/dL Final   • Creatinine 10/23/2022 0.97  0.76 - 1.27 mg/dL Final   • Sodium 10/23/2022 141  136 - 145 mmol/L Final   • Potassium 10/23/2022 4.0  3.5 - 5.2 mmol/L Final    Specimen hemolyzed.  Results may be affected. 2+ Hemolysis        • Chloride 10/23/2022 103  98 - 107 mmol/L Final   • CO2 10/23/2022 25.5  22.0 - 29.0 mmol/L Final   • Calcium 10/23/2022 9.3  8.6 - 10.5 mg/dL Final   • Total Protein 10/23/2022 6.8  6.0 - 8.5 g/dL Final   • Albumin 10/23/2022 4.08  3.50 - 5.20 g/dL Final   • ALT (SGPT) 10/23/2022 40  1 - 41 U/L Final    Specimen hemolyzed.  Results may be affected.   • AST (SGOT) 10/23/2022 24  1 - 40 U/L Final   • Alkaline Phosphatase 10/23/2022 98  39 - 117 U/L Final   • Total Bilirubin 10/23/2022 0.2  0.0 - 1.2 mg/dL Final   • Globulin 10/23/2022 2.7  gm/dL Final   •  A/G Ratio 10/23/2022 1.5  g/dL Final   • BUN/Creatinine Ratio 10/23/2022 14.4  7.0 - 25.0 Final   • Anion Gap 10/23/2022 12.5  5.0 - 15.0 mmol/L Final   • eGFR 10/23/2022 89.4  >60.0 mL/min/1.73 Final    National Kidney Foundation and American Society of Nephrology (ASN) Task Force recommended calculation based on the Chronic Kidney Disease Epidemiology Collaboration (CKD-EPI) equation refit without adjustment for race.   • Color, UA 10/23/2022 Dark Yellow (A)  Yellow, Straw Final   • Appearance, UA 10/23/2022 Cloudy (A)  Clear Final   • pH, UA 10/23/2022 6.5  5.0 - 8.0 Final   • Specific Gravity, UA 10/23/2022 >=1.030  1.005 - 1.030 Final   • Glucose, UA 10/23/2022 Negative  Negative Final   • Ketones, UA 10/23/2022 15 mg/dL (1+) (A)  Negative Final   • Bilirubin, UA 10/23/2022 Negative  Negative Final   • Blood, UA 10/23/2022 Negative  Negative Final   • Protein, UA 10/23/2022 Negative  Negative Final   • Leuk Esterase, UA 10/23/2022 Trace (A)  Negative Final   • Nitrite, UA 10/23/2022 Negative  Negative Final   • Urobilinogen, UA 10/23/2022 1.0 E.U./dL  0.2 - 1.0 E.U./dL Final   • C-Reactive Protein 10/23/2022 <0.30  0.00 - 0.50 mg/dL Final   • WBC 10/23/2022 9.25  3.40 - 10.80 10*3/mm3 Final   • RBC 10/23/2022 4.67  4.14 - 5.80 10*6/mm3 Final   • Hemoglobin 10/23/2022 14.9  13.0 - 17.7 g/dL Final   • Hematocrit 10/23/2022 42.2  37.5 - 51.0 % Final   • MCV 10/23/2022 90.4  79.0 - 97.0 fL Final   • MCH 10/23/2022 31.9  26.6 - 33.0 pg Final   • MCHC 10/23/2022 35.3  31.5 - 35.7 g/dL Final   • RDW 10/23/2022 12.0 (L)  12.3 - 15.4 % Final   • RDW-SD 10/23/2022 39.6  37.0 - 54.0 fl Final   • MPV 10/23/2022 9.4  6.0 - 12.0 fL Final   • Platelets 10/23/2022 262  140 - 450 10*3/mm3 Final   • Neutrophil % 10/23/2022 62.3  42.7 - 76.0 % Final   • Lymphocyte % 10/23/2022 25.6  19.6 - 45.3 % Final   • Monocyte % 10/23/2022 8.2  5.0 - 12.0 % Final   • Eosinophil % 10/23/2022 2.8  0.3 - 6.2 % Final   • Basophil % 10/23/2022  0.9  0.0 - 1.5 % Final   • Immature Grans % 10/23/2022 0.2  0.0 - 0.5 % Final   • Neutrophils, Absolute 10/23/2022 5.76  1.70 - 7.00 10*3/mm3 Final   • Lymphocytes, Absolute 10/23/2022 2.37  0.70 - 3.10 10*3/mm3 Final   • Monocytes, Absolute 10/23/2022 0.76  0.10 - 0.90 10*3/mm3 Final   • Eosinophils, Absolute 10/23/2022 0.26  0.00 - 0.40 10*3/mm3 Final   • Basophils, Absolute 10/23/2022 0.08  0.00 - 0.20 10*3/mm3 Final   • Immature Grans, Absolute 10/23/2022 0.02  0.00 - 0.05 10*3/mm3 Final   • nRBC 10/23/2022 0.0  0.0 - 0.2 /100 WBC Final   • RBC, UA 10/23/2022 6-12 (A)  None Seen, 0-2 /HPF Final   • WBC, UA 10/23/2022 3-5 (A)  None Seen, 0-2 /HPF Final   • Bacteria, UA 10/23/2022 None Seen  None Seen /HPF Final   • Squamous Epithelial Cells, UA 10/23/2022 0-2  None Seen, 0-2 /HPF Final   • Hyaline Casts, UA 10/23/2022 3-6  None Seen /LPF Final   • Methodology 10/23/2022 Automated Microscopy   Final      Smoking Cessation Counseling:  Former smoker.  Patient does not currently use any tobacco products.     Follow Up   Return in about 2 days (around 10/26/2022) for Next scheduled follow up, With Dr. Moya IN OR FOR RIGHT URESTEROSCOPY LASER LITHO/POSS STENT.    Patient was given instructions and counseling regarding his condition or for health maintenance advice. Please see specific information pulled into the AVS if appropriate.          This document has been electronically signed by Griselda Cheng-Akwa, APRN     Transcribed from ambient dictation for Griselda Cheng-Akwa, APRN by Raleigh Bailey.  10/24/22   14:59 EDT    Patient or patient representative verbalized consent to the visit recording.  I have personally performed the services described in this document as transcribed by the above individual, and it is both accurate and complete.  Griselda Cheng-Akwa, ALTAGRACIA  10/24/2022  19:56 EDT    October 24, 2022 19:56 EDT      Dictated Utilizing Dragon Dictation: Part of this note may be an electronic  transcription/translation of spoken language to printed text using the Dragon Dictation System.

## 2022-10-24 NOTE — DISCHARGE INSTRUCTIONS
Use Toradol primarily for pain.  Hydrocodone prescribed for severe breakthrough pain.  May cause drowsiness do not drive will take this medication.  Also has high risk of addiction and abuse potential.  Follow-up with Dr. Moya in 1 week.

## 2022-10-26 ENCOUNTER — ANESTHESIA EVENT (OUTPATIENT)
Dept: PERIOP | Facility: HOSPITAL | Age: 62
End: 2022-10-26

## 2022-10-26 ENCOUNTER — ANESTHESIA (OUTPATIENT)
Dept: PERIOP | Facility: HOSPITAL | Age: 62
End: 2022-10-26

## 2022-10-26 ENCOUNTER — APPOINTMENT (OUTPATIENT)
Dept: GENERAL RADIOLOGY | Facility: HOSPITAL | Age: 62
End: 2022-10-26

## 2022-10-26 ENCOUNTER — HOSPITAL ENCOUNTER (OUTPATIENT)
Facility: HOSPITAL | Age: 62
Setting detail: HOSPITAL OUTPATIENT SURGERY
Discharge: HOME | End: 2022-10-26
Attending: UROLOGY | Admitting: UROLOGY

## 2022-10-26 VITALS
OXYGEN SATURATION: 98 % | HEART RATE: 67 BPM | RESPIRATION RATE: 18 BRPM | BODY MASS INDEX: 34.38 KG/M2 | HEIGHT: 72 IN | SYSTOLIC BLOOD PRESSURE: 138 MMHG | WEIGHT: 253.8 LBS | DIASTOLIC BLOOD PRESSURE: 84 MMHG | TEMPERATURE: 97.9 F

## 2022-10-26 DIAGNOSIS — N20.0 RIGHT NEPHROLITHIASIS: ICD-10-CM

## 2022-10-26 DIAGNOSIS — R10.9 FLANK PAIN: ICD-10-CM

## 2022-10-26 DIAGNOSIS — N20.1 RIGHT URETERAL STONE: ICD-10-CM

## 2022-10-26 PROCEDURE — 74018 RADEX ABDOMEN 1 VIEW: CPT | Performed by: RADIOLOGY

## 2022-10-26 PROCEDURE — 25010000002 IOPAMIDOL 61 % SOLUTION: Performed by: UROLOGY

## 2022-10-26 PROCEDURE — 76000 FLUOROSCOPY <1 HR PHYS/QHP: CPT | Performed by: RADIOLOGY

## 2022-10-26 PROCEDURE — 25010000002 FENTANYL CITRATE (PF) 50 MCG/ML SOLUTION: Performed by: NURSE ANESTHETIST, CERTIFIED REGISTERED

## 2022-10-26 PROCEDURE — 25010000002 PROPOFOL 10 MG/ML EMULSION: Performed by: NURSE ANESTHETIST, CERTIFIED REGISTERED

## 2022-10-26 PROCEDURE — 74018 RADEX ABDOMEN 1 VIEW: CPT

## 2022-10-26 PROCEDURE — 52356 CYSTO/URETERO W/LITHOTRIPSY: CPT | Performed by: UROLOGY

## 2022-10-26 PROCEDURE — 25010000002 MIDAZOLAM PER 1 MG: Performed by: NURSE ANESTHETIST, CERTIFIED REGISTERED

## 2022-10-26 PROCEDURE — C2617 STENT, NON-COR, TEM W/O DEL: HCPCS | Performed by: UROLOGY

## 2022-10-26 PROCEDURE — C1894 INTRO/SHEATH, NON-LASER: HCPCS | Performed by: UROLOGY

## 2022-10-26 PROCEDURE — C1769 GUIDE WIRE: HCPCS | Performed by: UROLOGY

## 2022-10-26 PROCEDURE — 76000 FLUOROSCOPY <1 HR PHYS/QHP: CPT

## 2022-10-26 PROCEDURE — 25010000002 GENTAMICIN PER 80 MG: Performed by: NURSE PRACTITIONER

## 2022-10-26 PROCEDURE — 25010000002 ONDANSETRON PER 1 MG: Performed by: NURSE ANESTHETIST, CERTIFIED REGISTERED

## 2022-10-26 DEVICE — URETERAL STENT
Type: IMPLANTABLE DEVICE | Site: URETER | Status: FUNCTIONAL
Brand: POLARIS™ ULTRA

## 2022-10-26 RX ORDER — FAMOTIDINE 10 MG/ML
INJECTION, SOLUTION INTRAVENOUS AS NEEDED
Status: DISCONTINUED | OUTPATIENT
Start: 2022-10-26 | End: 2022-10-26 | Stop reason: SURG

## 2022-10-26 RX ORDER — KETOROLAC TROMETHAMINE 10 MG/1
10 TABLET, FILM COATED ORAL EVERY 6 HOURS PRN
Qty: 16 TABLET | Refills: 2 | Status: SHIPPED | OUTPATIENT
Start: 2022-10-26

## 2022-10-26 RX ORDER — MAGNESIUM HYDROXIDE 1200 MG/15ML
LIQUID ORAL AS NEEDED
Status: DISCONTINUED | OUTPATIENT
Start: 2022-10-26 | End: 2022-10-26 | Stop reason: HOSPADM

## 2022-10-26 RX ORDER — HYDROCODONE BITARTRATE AND ACETAMINOPHEN 10; 325 MG/1; MG/1
1 TABLET ORAL EVERY 4 HOURS PRN
Qty: 12 TABLET | Refills: 0 | Status: SHIPPED | OUTPATIENT
Start: 2022-10-26

## 2022-10-26 RX ORDER — SODIUM CHLORIDE 0.9 % (FLUSH) 0.9 %
10 SYRINGE (ML) INJECTION AS NEEDED
Status: DISCONTINUED | OUTPATIENT
Start: 2022-10-26 | End: 2022-10-26 | Stop reason: HOSPADM

## 2022-10-26 RX ORDER — OXYCODONE HYDROCHLORIDE AND ACETAMINOPHEN 5; 325 MG/1; MG/1
1 TABLET ORAL ONCE AS NEEDED
Status: DISCONTINUED | OUTPATIENT
Start: 2022-10-26 | End: 2022-10-26 | Stop reason: HOSPADM

## 2022-10-26 RX ORDER — KETOROLAC TROMETHAMINE 30 MG/ML
30 INJECTION, SOLUTION INTRAMUSCULAR; INTRAVENOUS EVERY 6 HOURS PRN
Status: DISCONTINUED | OUTPATIENT
Start: 2022-10-26 | End: 2022-10-26 | Stop reason: HOSPADM

## 2022-10-26 RX ORDER — SODIUM CHLORIDE, SODIUM LACTATE, POTASSIUM CHLORIDE, CALCIUM CHLORIDE 600; 310; 30; 20 MG/100ML; MG/100ML; MG/100ML; MG/100ML
INJECTION, SOLUTION INTRAVENOUS CONTINUOUS PRN
Status: DISCONTINUED | OUTPATIENT
Start: 2022-10-26 | End: 2022-10-26 | Stop reason: SURG

## 2022-10-26 RX ORDER — FENTANYL CITRATE 50 UG/ML
50 INJECTION, SOLUTION INTRAMUSCULAR; INTRAVENOUS
Status: DISCONTINUED | OUTPATIENT
Start: 2022-10-26 | End: 2022-10-26 | Stop reason: HOSPADM

## 2022-10-26 RX ORDER — GENTAMICIN SULFATE 80 MG/100ML
80 INJECTION, SOLUTION INTRAVENOUS ONCE
Status: COMPLETED | OUTPATIENT
Start: 2022-10-26 | End: 2022-10-26

## 2022-10-26 RX ORDER — ONDANSETRON 2 MG/ML
INJECTION INTRAMUSCULAR; INTRAVENOUS AS NEEDED
Status: DISCONTINUED | OUTPATIENT
Start: 2022-10-26 | End: 2022-10-26 | Stop reason: SURG

## 2022-10-26 RX ORDER — SODIUM CHLORIDE, SODIUM LACTATE, POTASSIUM CHLORIDE, CALCIUM CHLORIDE 600; 310; 30; 20 MG/100ML; MG/100ML; MG/100ML; MG/100ML
100 INJECTION, SOLUTION INTRAVENOUS ONCE AS NEEDED
Status: DISCONTINUED | OUTPATIENT
Start: 2022-10-26 | End: 2022-10-26 | Stop reason: HOSPADM

## 2022-10-26 RX ORDER — MEPERIDINE HYDROCHLORIDE 25 MG/ML
12.5 INJECTION INTRAMUSCULAR; INTRAVENOUS; SUBCUTANEOUS
Status: DISCONTINUED | OUTPATIENT
Start: 2022-10-26 | End: 2022-10-26 | Stop reason: HOSPADM

## 2022-10-26 RX ORDER — IPRATROPIUM BROMIDE AND ALBUTEROL SULFATE 2.5; .5 MG/3ML; MG/3ML
3 SOLUTION RESPIRATORY (INHALATION) ONCE AS NEEDED
Status: DISCONTINUED | OUTPATIENT
Start: 2022-10-26 | End: 2022-10-26 | Stop reason: HOSPADM

## 2022-10-26 RX ORDER — PROPOFOL 10 MG/ML
VIAL (ML) INTRAVENOUS AS NEEDED
Status: DISCONTINUED | OUTPATIENT
Start: 2022-10-26 | End: 2022-10-26 | Stop reason: SURG

## 2022-10-26 RX ORDER — MIDAZOLAM HYDROCHLORIDE 1 MG/ML
1 INJECTION INTRAMUSCULAR; INTRAVENOUS
Status: DISCONTINUED | OUTPATIENT
Start: 2022-10-26 | End: 2022-10-26 | Stop reason: HOSPADM

## 2022-10-26 RX ORDER — FENTANYL CITRATE 50 UG/ML
INJECTION, SOLUTION INTRAMUSCULAR; INTRAVENOUS AS NEEDED
Status: DISCONTINUED | OUTPATIENT
Start: 2022-10-26 | End: 2022-10-26 | Stop reason: SURG

## 2022-10-26 RX ORDER — MIDAZOLAM HYDROCHLORIDE 1 MG/ML
INJECTION INTRAMUSCULAR; INTRAVENOUS AS NEEDED
Status: DISCONTINUED | OUTPATIENT
Start: 2022-10-26 | End: 2022-10-26 | Stop reason: SURG

## 2022-10-26 RX ORDER — ONDANSETRON 2 MG/ML
4 INJECTION INTRAMUSCULAR; INTRAVENOUS AS NEEDED
Status: DISCONTINUED | OUTPATIENT
Start: 2022-10-26 | End: 2022-10-26 | Stop reason: HOSPADM

## 2022-10-26 RX ORDER — SODIUM CHLORIDE 0.9 % (FLUSH) 0.9 %
10 SYRINGE (ML) INJECTION EVERY 12 HOURS SCHEDULED
Status: DISCONTINUED | OUTPATIENT
Start: 2022-10-26 | End: 2022-10-26 | Stop reason: HOSPADM

## 2022-10-26 RX ORDER — SODIUM CHLORIDE, SODIUM LACTATE, POTASSIUM CHLORIDE, CALCIUM CHLORIDE 600; 310; 30; 20 MG/100ML; MG/100ML; MG/100ML; MG/100ML
125 INJECTION, SOLUTION INTRAVENOUS ONCE
Status: COMPLETED | OUTPATIENT
Start: 2022-10-26 | End: 2022-10-26

## 2022-10-26 RX ADMIN — PROPOFOL 200 MG: 10 INJECTION, EMULSION INTRAVENOUS at 09:05

## 2022-10-26 RX ADMIN — SODIUM CHLORIDE, POTASSIUM CHLORIDE, SODIUM LACTATE AND CALCIUM CHLORIDE: 600; 310; 30; 20 INJECTION, SOLUTION INTRAVENOUS at 09:34

## 2022-10-26 RX ADMIN — FAMOTIDINE 20 MG: 10 INJECTION INTRAVENOUS at 09:02

## 2022-10-26 RX ADMIN — FENTANYL CITRATE 50 MCG: 50 INJECTION, SOLUTION INTRAMUSCULAR; INTRAVENOUS at 09:59

## 2022-10-26 RX ADMIN — FENTANYL CITRATE 100 MCG: 50 INJECTION INTRAMUSCULAR; INTRAVENOUS at 09:02

## 2022-10-26 RX ADMIN — MIDAZOLAM HYDROCHLORIDE 2 MG: 1 INJECTION, SOLUTION INTRAMUSCULAR; INTRAVENOUS at 09:02

## 2022-10-26 RX ADMIN — SODIUM CHLORIDE, POTASSIUM CHLORIDE, SODIUM LACTATE AND CALCIUM CHLORIDE 125 ML/HR: 600; 310; 30; 20 INJECTION, SOLUTION INTRAVENOUS at 08:24

## 2022-10-26 RX ADMIN — GENTAMICIN SULFATE 80 MG: 80 INJECTION, SOLUTION INTRAVENOUS at 09:02

## 2022-10-26 RX ADMIN — SODIUM CHLORIDE, POTASSIUM CHLORIDE, SODIUM LACTATE AND CALCIUM CHLORIDE: 600; 310; 30; 20 INJECTION, SOLUTION INTRAVENOUS at 09:02

## 2022-10-26 RX ADMIN — ONDANSETRON 4 MG: 2 INJECTION INTRAMUSCULAR; INTRAVENOUS at 09:02

## 2022-10-26 NOTE — ANESTHESIA PREPROCEDURE EVALUATION
Anesthesia Evaluation     no history of anesthetic complications:  NPO Solid Status: > 8 hours  NPO Liquid Status: > 8 hours           Airway   Mallampati: II  TM distance: >3 FB  Neck ROM: full  No difficulty expected  Dental - normal exam     Pulmonary - normal exam   (+) a smoker Former,   Cardiovascular - normal exam        Neuro/Psych  GI/Hepatic/Renal/Endo    (+)   renal disease stones,     Musculoskeletal     Abdominal  - normal exam   Substance History      OB/GYN          Other                      Anesthesia Plan    ASA 3     general     intravenous induction     Anesthetic plan, risks, benefits, and alternatives have been provided, discussed and informed consent has been obtained with: patient.        CODE STATUS:

## 2022-10-26 NOTE — ANESTHESIA PROCEDURE NOTES
Airway  Urgency: elective    Airway not difficult    General Information and Staff    Patient location during procedure: OR  CRNA/CAA: Roberto Kyle CRNA    Indications and Patient Condition    Preoxygenated: yes  MILS not maintained throughout  Mask difficulty assessment: 0 - not attempted    Final Airway Details  Final airway type: supraglottic airway      Successful airway: unique  Size 4     Number of attempts at approach: 1  Assessment: lips, teeth, and gum same as pre-op and atraumatic intubation    Additional Comments  Atraumatic LMA placement, dentition unchanged.

## 2022-10-26 NOTE — ANESTHESIA POSTPROCEDURE EVALUATION
Patient: Wil Barone    Procedure Summary     Date: 10/26/22 Room / Location: [facility] OR  /  [facility] OR    Anesthesia Start: 0902 Anesthesia Stop: 0938    Procedure: URETEROSCOPY LASER LITHOTRIPSY RETROGRADE PYELOGRAM WITH STENT PLACEMENT (Right) Diagnosis:       Right nephrolithiasis      Right ureteral stone      Flank pain      (Right nephrolithiasis [N20.0])      (Right ureteral stone [N20.1])      (Flank pain [R10.9])    Surgeons: Holland Moya MD Provider: George Srivastava MD    Anesthesia Type: Not recorded ASA Status: Not recorded          Anesthesia Type: No value filed.    Vitals  Vitals Value Taken Time   /87 10/26/22 1010   Temp 98 °F (36.7 °C) 10/26/22 0940   Pulse 60 10/26/22 1010   Resp 14 10/26/22 1010   SpO2 96 % 10/26/22 1010           Post Anesthesia Care and Evaluation    Patient location during evaluation: PHASE II  Patient participation: complete - patient participated  Level of consciousness: awake and alert  Pain score: 0  Pain management: adequate    Airway patency: patent  Anesthetic complications: No anesthetic complications    Cardiovascular status: acceptable  Respiratory status: acceptable  Hydration status: acceptable

## 2022-11-03 ENCOUNTER — PROCEDURE VISIT (OUTPATIENT)
Dept: UROLOGY | Facility: CLINIC | Age: 62
End: 2022-11-03

## 2022-11-03 VITALS — HEIGHT: 72 IN | BODY MASS INDEX: 34.27 KG/M2 | WEIGHT: 253 LBS

## 2022-11-03 DIAGNOSIS — N20.1 RIGHT URETERAL STONE: Primary | ICD-10-CM

## 2022-11-03 DIAGNOSIS — N20.1 URETERAL CALCULUS: ICD-10-CM

## 2022-11-03 PROCEDURE — 52310 CYSTOSCOPY AND TREATMENT: CPT | Performed by: UROLOGY

## 2022-11-03 RX ORDER — GENTAMICIN SULFATE 40 MG/ML
80 INJECTION, SOLUTION INTRAMUSCULAR; INTRAVENOUS ONCE
Status: COMPLETED | OUTPATIENT
Start: 2022-11-03 | End: 2022-11-03

## 2022-11-03 RX ORDER — TAMSULOSIN HYDROCHLORIDE 0.4 MG/1
1 CAPSULE ORAL DAILY
Qty: 14 CAPSULE | Refills: 0 | Status: SHIPPED | OUTPATIENT
Start: 2022-11-03 | End: 2023-01-17

## 2022-11-03 RX ADMIN — GENTAMICIN SULFATE 80 MG: 40 INJECTION, SOLUTION INTRAMUSCULAR; INTRAVENOUS at 11:20

## 2022-11-03 NOTE — PROGRESS NOTES
Chief Complaint:      Chief Complaint   Patient presents with   • Nephrolithiasis     Cysto Stent removal        HPI:   62 y.o. male for stent removal secondary to ureteroscopy.    Past Medical History:     Past Medical History:   Diagnosis Date   • Arthritis    • Kidney stone        Current Meds:     Current Outpatient Medications   Medication Sig Dispense Refill   • HYDROcodone-acetaminophen (NORCO)  MG per tablet Take 1 tablet by mouth Every 6 (Six) Hours As Needed for Moderate Pain for up to 10 doses. 10 tablet 0   • HYDROcodone-acetaminophen (NORCO)  MG per tablet Take 1 tablet by mouth Every 4 (Four) Hours As Needed for Moderate Pain (Pain). 12 tablet 0   • ketorolac (TORADOL) 10 MG tablet Take 1 tablet by mouth Every 6 (Six) Hours As Needed for Moderate Pain for up to 15 doses. 15 tablet 0   • ketorolac (TORADOL) 10 MG tablet Take 1 tablet by mouth Every 6 (Six) Hours As Needed for Moderate Pain. 16 tablet 2   • ondansetron ODT (ZOFRAN-ODT) 4 MG disintegrating tablet Place 1 tablet on the tongue Every 8 (Eight) Hours As Needed for Nausea or Vomiting for up to 15 days. 15 tablet 0   • tamsulosin (FLOMAX) 0.4 MG capsule 24 hr capsule Take 1 capsule by mouth Daily for 14 days. 14 capsule 0     No current facility-administered medications for this visit.        Allergies:      No Known Allergies     Past Surgical History:     Past Surgical History:   Procedure Laterality Date   • CHOLECYSTECTOMY  20 years   • HERNIA REPAIR     • KIDNEY STONE SURGERY     • URETEROSCOPY LASER LITHOTRIPSY WITH STENT INSERTION Right 10/26/2022    Procedure: URETEROSCOPY LASER LITHOTRIPSY RETROGRADE PYELOGRAM WITH STENT PLACEMENT;  Surgeon: Holland Moya MD;  Location: [facility];  Service: Urology;  Laterality: Right;       Social History:     Social History     Socioeconomic History   • Marital status:    Tobacco Use   • Smoking status: Former     Packs/day: 2.00     Types: Cigarettes   • Smokeless  tobacco: Former     Types: Snuff, Chew   Substance and Sexual Activity   • Alcohol use: No   • Drug use: No   • Sexual activity: Yes     Partners: Female     Birth control/protection: None       Family History:     Family History   Problem Relation Age of Onset   • Cancer Father        Review of Systems:     Review of Systems   Constitutional: Negative.    HENT: Negative.    Eyes: Negative.    Respiratory: Negative.    Cardiovascular: Negative.    Gastrointestinal: Negative.    Endocrine: Negative.    Musculoskeletal: Negative.    Allergic/Immunologic: Negative.    Neurological: Negative.    Hematological: Negative.    Psychiatric/Behavioral: Negative.        Physical Exam:     Physical Exam  Vitals and nursing note reviewed.   Constitutional:       Appearance: He is well-developed.   HENT:      Head: Normocephalic and atraumatic.   Eyes:      Conjunctiva/sclera: Conjunctivae normal.      Pupils: Pupils are equal, round, and reactive to light.   Cardiovascular:      Rate and Rhythm: Normal rate and regular rhythm.      Heart sounds: Normal heart sounds.   Pulmonary:      Effort: Pulmonary effort is normal.      Breath sounds: Normal breath sounds.   Abdominal:      General: Bowel sounds are normal.      Palpations: Abdomen is soft.   Musculoskeletal:         General: Normal range of motion.      Cervical back: Normal range of motion.   Skin:     General: Skin is warm and dry.   Neurological:      Mental Status: He is alert and oriented to person, place, and time.      Deep Tendon Reflexes: Reflexes are normal and symmetric.   Psychiatric:         Behavior: Behavior normal.         Thought Content: Thought content normal.         Judgment: Judgment normal.         I have reviewed the following portions of the patient's history: Allergies, current medications, past family history, past medical history, past social history, past surgical history, problem list, and ROS and confirm it is accurate.      Recent Image (CT  and/or KUB):      CT Abdomen and Pelvis: No results found for this or any previous visit.       CT Stone Protocol: Results for orders placed during the hospital encounter of 10/23/22    CT Abdomen Pelvis Stone Protocol    Narrative  CT Abdomen Pelvis WO    INDICATION:  Right lower quadrant pain.    TECHNIQUE:  CT of the abdomen and pelvis without IV contrast. Coronal and sagittal reconstructions were obtained.  Radiation dose reduction techniques included automated exposure control or exposure modulation based on body size. Count of known CT and cardiac nuc  med studies performed in previous 12 months: 0.    COMPARISON:  CT abdomen pelvis 12/5/2019    FINDINGS:  Abdomen: Small less than 5 mm right lower lobe lung nodule unchanged. Diffuse hepatic steatosis. Postcholecystectomy. The spleen appears normal. Pancreas and adrenal glands unremarkable.    5 mm proximal right ureteral stone with mild to moderate right-sided hydronephrosis. Additional 5 mm right intrarenal stone. Punctate nonobstructing left renal stone.    Colonic diverticulosis. Stomach and small bowel unremarkable. The appendix appears normal. Aortic atherosclerotic changes without aneurysm.    Pelvis: Bladder nondistended. Prostate unremarkable. Ventral hernia lower abdominal wall containing omental fat, several loops of small bowel and the anterior aspect of the bladder.    Impression  5 mm proximal right ureteral stone with moderate right-sided hydronephrosis.    Nonobstructing right and left intrarenal stones.    Diffuse hepatic steatosis.    Lower midline abdominal wall hernia which appears to contain omental fat, several loops of small bowel and possibly the anterior aspect of the bladder.          Signer Name: OWEN Danielson MD  Signed: 10/23/2022 9:47 PM  Workstation Name: [facility]  [facility]       KUB: Results for orders placed during the hospital encounter of 10/26/22    XR Abdomen KUB    Narrative  EXAM:  XR  Abdomen, 1 View    EXAM DATE:  10/26/2022 8:12 AM    CLINICAL HISTORY:  stone; R10.9-Unspecified abdominal pain; N20.1-Calculus of ureter;  N20.0-Calculus of kidney    TECHNIQUE:  Frontal supine view of the abdomen/pelvis.    COMPARISON:  No relevant prior studies available.    FINDINGS:  GASTROINTESTINAL TRACT:  Unremarkable.  No dilation.  BONES/JOINTS:  Unremarkable.  VASCULATURE:  Vascular calcifications identified.  OTHER FINDINGS:  Probable mid distal right ureter 5 mm calculus.    Impression  Probable mid distal right ureter 5 mm calculus.    This report was finalized on 10/26/2022 8:24 AM by Dr. Bernardo Matthews MD.       Labs (past 3 months):      Admission on 10/23/2022, Discharged on 10/23/2022   Component Date Value Ref Range Status   • Extra Tube 10/23/2022 Hold for add-ons.   Final    Auto resulted.   • Extra Tube 10/23/2022 hold for add-on   Final    Auto resulted   • Extra Tube 10/23/2022 Hold for add-ons.   Final    Auto resulted.   • Extra Tube 10/23/2022 Hold for add-ons.   Final    Auto resulted   • Glucose 10/23/2022 171 (H)  65 - 99 mg/dL Final   • BUN 10/23/2022 14  8 - 23 mg/dL Final   • Creatinine 10/23/2022 0.97  0.76 - 1.27 mg/dL Final   • Sodium 10/23/2022 141  136 - 145 mmol/L Final   • Potassium 10/23/2022 4.0  3.5 - 5.2 mmol/L Final    Specimen hemolyzed.  Results may be affected. 2+ Hemolysis        • Chloride 10/23/2022 103  98 - 107 mmol/L Final   • CO2 10/23/2022 25.5  22.0 - 29.0 mmol/L Final   • Calcium 10/23/2022 9.3  8.6 - 10.5 mg/dL Final   • Total Protein 10/23/2022 6.8  6.0 - 8.5 g/dL Final   • Albumin 10/23/2022 4.08  3.50 - 5.20 g/dL Final   • ALT (SGPT) 10/23/2022 40  1 - 41 U/L Final    Specimen hemolyzed.  Results may be affected.   • AST (SGOT) 10/23/2022 24  1 - 40 U/L Final   • Alkaline Phosphatase 10/23/2022 98  39 - 117 U/L Final   • Total Bilirubin 10/23/2022 0.2  0.0 - 1.2 mg/dL Final   • Globulin 10/23/2022 2.7  gm/dL Final   • A/G Ratio 10/23/2022 1.5  g/dL Final    • BUN/Creatinine Ratio 10/23/2022 14.4  7.0 - 25.0 Final   • Anion Gap 10/23/2022 12.5  5.0 - 15.0 mmol/L Final   • eGFR 10/23/2022 89.4  >60.0 mL/min/1.73 Final    National Kidney Foundation and American Society of Nephrology (ASN) Task Force recommended calculation based on the Chronic Kidney Disease Epidemiology Collaboration (CKD-EPI) equation refit without adjustment for race.   • Color, UA 10/23/2022 Dark Yellow (A)  Yellow, Straw Final   • Appearance, UA 10/23/2022 Cloudy (A)  Clear Final   • pH, UA 10/23/2022 6.5  5.0 - 8.0 Final   • Specific Gravity, UA 10/23/2022 >=1.030  1.005 - 1.030 Final   • Glucose, UA 10/23/2022 Negative  Negative Final   • Ketones, UA 10/23/2022 15 mg/dL (1+) (A)  Negative Final   • Bilirubin, UA 10/23/2022 Negative  Negative Final   • Blood, UA 10/23/2022 Negative  Negative Final   • Protein, UA 10/23/2022 Negative  Negative Final   • Leuk Esterase, UA 10/23/2022 Trace (A)  Negative Final   • Nitrite, UA 10/23/2022 Negative  Negative Final   • Urobilinogen, UA 10/23/2022 1.0 E.U./dL  0.2 - 1.0 E.U./dL Final   • C-Reactive Protein 10/23/2022 <0.30  0.00 - 0.50 mg/dL Final   • WBC 10/23/2022 9.25  3.40 - 10.80 10*3/mm3 Final   • RBC 10/23/2022 4.67  4.14 - 5.80 10*6/mm3 Final   • Hemoglobin 10/23/2022 14.9  13.0 - 17.7 g/dL Final   • Hematocrit 10/23/2022 42.2  37.5 - 51.0 % Final   • MCV 10/23/2022 90.4  79.0 - 97.0 fL Final   • MCH 10/23/2022 31.9  26.6 - 33.0 pg Final   • MCHC 10/23/2022 35.3  31.5 - 35.7 g/dL Final   • RDW 10/23/2022 12.0 (L)  12.3 - 15.4 % Final   • RDW-SD 10/23/2022 39.6  37.0 - 54.0 fl Final   • MPV 10/23/2022 9.4  6.0 - 12.0 fL Final   • Platelets 10/23/2022 262  140 - 450 10*3/mm3 Final   • Neutrophil % 10/23/2022 62.3  42.7 - 76.0 % Final   • Lymphocyte % 10/23/2022 25.6  19.6 - 45.3 % Final   • Monocyte % 10/23/2022 8.2  5.0 - 12.0 % Final   • Eosinophil % 10/23/2022 2.8  0.3 - 6.2 % Final   • Basophil % 10/23/2022 0.9  0.0 - 1.5 % Final   • Immature  Grans % 10/23/2022 0.2  0.0 - 0.5 % Final   • Neutrophils, Absolute 10/23/2022 5.76  1.70 - 7.00 10*3/mm3 Final   • Lymphocytes, Absolute 10/23/2022 2.37  0.70 - 3.10 10*3/mm3 Final   • Monocytes, Absolute 10/23/2022 0.76  0.10 - 0.90 10*3/mm3 Final   • Eosinophils, Absolute 10/23/2022 0.26  0.00 - 0.40 10*3/mm3 Final   • Basophils, Absolute 10/23/2022 0.08  0.00 - 0.20 10*3/mm3 Final   • Immature Grans, Absolute 10/23/2022 0.02  0.00 - 0.05 10*3/mm3 Final   • nRBC 10/23/2022 0.0  0.0 - 0.2 /100 WBC Final   • RBC, UA 10/23/2022 6-12 (A)  None Seen, 0-2 /HPF Final   • WBC, UA 10/23/2022 3-5 (A)  None Seen, 0-2 /HPF Final   • Bacteria, UA 10/23/2022 None Seen  None Seen /HPF Final   • Squamous Epithelial Cells, UA 10/23/2022 0-2  None Seen, 0-2 /HPF Final   • Hyaline Casts, UA 10/23/2022 3-6  None Seen /LPF Final   • Methodology 10/23/2022 Automated Microscopy   Final        Procedure:     Cystoscopy and stent removal: Following an appropriate informed consent including the risk of infection, the patient was pretreated with 80 mg of intramuscular gentamicin.  Following this, the patient was prepped and draped in a sterile fashion using the flexible cystoscope. I went ahead and examined the bladder, identified the stent exiting the ureteral orifice.  It was grasped with grasping forceps and removed without complication.  The patient tolerated it well.    Assessment/Plan:   Foreign body in bladder status post stone removal          This document has been electronically signed by KAMALJIT DELACRUZ MD November 3, 2022 11:09 EDT    Dictated Utilizing Dragon Dictation: Part of this note may be an electronic transcription/translation of spoken language to printed text using the Dragon Dictation System.

## 2022-11-21 ENCOUNTER — OFFICE VISIT (OUTPATIENT)
Dept: UROLOGY | Facility: CLINIC | Age: 62
End: 2022-11-21

## 2022-11-21 ENCOUNTER — HOSPITAL ENCOUNTER (OUTPATIENT)
Dept: GENERAL RADIOLOGY | Facility: HOSPITAL | Age: 62
Discharge: HOME | End: 2022-11-21
Admitting: UROLOGY

## 2022-11-21 VITALS — WEIGHT: 253 LBS | BODY MASS INDEX: 34.27 KG/M2 | HEIGHT: 72 IN

## 2022-11-21 DIAGNOSIS — N20.0 RENAL CALCULUS: ICD-10-CM

## 2022-11-21 DIAGNOSIS — N20.1 RIGHT URETERAL STONE: Primary | ICD-10-CM

## 2022-11-21 DIAGNOSIS — N20.1 RIGHT URETERAL STONE: ICD-10-CM

## 2022-11-21 PROCEDURE — 74018 RADEX ABDOMEN 1 VIEW: CPT

## 2022-11-21 PROCEDURE — 74018 RADEX ABDOMEN 1 VIEW: CPT | Performed by: RADIOLOGY

## 2022-11-21 PROCEDURE — 99213 OFFICE O/P EST LOW 20 MIN: CPT | Performed by: UROLOGY

## 2022-11-21 RX ORDER — TADALAFIL 5 MG/1
5 TABLET ORAL DAILY
Qty: 30 TABLET | Refills: 6 | Status: SHIPPED | OUTPATIENT
Start: 2022-11-21

## 2022-11-21 NOTE — PROGRESS NOTES
Chief Complaint:      Chief Complaint   Patient presents with   • KIDNEY STONE        HPI:   62 y.o. male returns today he had an orchiectomy for infected mesh.  From a stone standpoint he is doing well he is having no complaints or problems.  Exams unremarkable.  KUB was essentially negative    Past Medical History:     Past Medical History:   Diagnosis Date   • Arthritis    • Kidney stone        Current Meds:     Current Outpatient Medications   Medication Sig Dispense Refill   • HYDROcodone-acetaminophen (NORCO)  MG per tablet Take 1 tablet by mouth Every 6 (Six) Hours As Needed for Moderate Pain for up to 10 doses. 10 tablet 0   • HYDROcodone-acetaminophen (NORCO)  MG per tablet Take 1 tablet by mouth Every 4 (Four) Hours As Needed for Moderate Pain (Pain). 12 tablet 0   • ketorolac (TORADOL) 10 MG tablet Take 1 tablet by mouth Every 6 (Six) Hours As Needed for Moderate Pain for up to 15 doses. 15 tablet 0   • ketorolac (TORADOL) 10 MG tablet Take 1 tablet by mouth Every 6 (Six) Hours As Needed for Moderate Pain. 16 tablet 2     No current facility-administered medications for this visit.        Allergies:      No Known Allergies     Past Surgical History:     Past Surgical History:   Procedure Laterality Date   • CHOLECYSTECTOMY  20 years   • HERNIA REPAIR     • KIDNEY STONE SURGERY     • ORCHIECTOMY  2020   • URETEROSCOPY LASER LITHOTRIPSY WITH STENT INSERTION Right 10/26/2022    Procedure: URETEROSCOPY LASER LITHOTRIPSY RETROGRADE PYELOGRAM WITH STENT PLACEMENT;  Surgeon: Holland Moya MD;  Location: [facility];  Service: Urology;  Laterality: Right;       Social History:     Social History     Socioeconomic History   • Marital status:    Tobacco Use   • Smoking status: Former     Packs/day: 2.00     Years: 20.00     Pack years: 40.00     Types: Cigarettes     Quit date: 1999     Years since quittin.9   • Smokeless tobacco: Former     Types: Snuff, Chew   Substance and  Sexual Activity   • Alcohol use: No   • Drug use: No   • Sexual activity: Yes     Partners: Female     Birth control/protection: None       Family History:     Family History   Problem Relation Age of Onset   • Cancer Father        Review of Systems:     Review of Systems   Constitutional: Negative.    HENT: Negative.    Eyes: Negative.    Respiratory: Negative.    Cardiovascular: Negative.    Gastrointestinal: Negative.    Endocrine: Negative.    Musculoskeletal: Negative.    Allergic/Immunologic: Negative.    Neurological: Negative.    Hematological: Negative.    Psychiatric/Behavioral: Negative.        Physical Exam:     Physical Exam  Vitals and nursing note reviewed.   Constitutional:       Appearance: He is well-developed.   HENT:      Head: Normocephalic and atraumatic.   Eyes:      Conjunctiva/sclera: Conjunctivae normal.      Pupils: Pupils are equal, round, and reactive to light.   Cardiovascular:      Rate and Rhythm: Normal rate and regular rhythm.      Heart sounds: Normal heart sounds.   Pulmonary:      Effort: Pulmonary effort is normal.      Breath sounds: Normal breath sounds.   Abdominal:      General: Bowel sounds are normal.      Palpations: Abdomen is soft.   Musculoskeletal:         General: Normal range of motion.      Cervical back: Normal range of motion.   Skin:     General: Skin is warm and dry.   Neurological:      Mental Status: He is alert and oriented to person, place, and time.      Deep Tendon Reflexes: Reflexes are normal and symmetric.   Psychiatric:         Behavior: Behavior normal.         Thought Content: Thought content normal.         Judgment: Judgment normal.         I have reviewed the following portions of the patient's history: Allergies, current medications, past family history, past medical history, past social history, past surgical history, problem list, and ROS and confirm it is accurate.    Recent Image (CT and/or KUB):      CT Abdomen and Pelvis: No results found  for this or any previous visit.       CT Stone Protocol: Results for orders placed during the hospital encounter of 10/23/22    CT Abdomen Pelvis Stone Protocol    Narrative  CT Abdomen Pelvis WO    INDICATION:  Right lower quadrant pain.    TECHNIQUE:  CT of the abdomen and pelvis without IV contrast. Coronal and sagittal reconstructions were obtained.  Radiation dose reduction techniques included automated exposure control or exposure modulation based on body size. Count of known CT and cardiac nuc  med studies performed in previous 12 months: 0.    COMPARISON:  CT abdomen pelvis 12/5/2019    FINDINGS:  Abdomen: Small less than 5 mm right lower lobe lung nodule unchanged. Diffuse hepatic steatosis. Postcholecystectomy. The spleen appears normal. Pancreas and adrenal glands unremarkable.    5 mm proximal right ureteral stone with mild to moderate right-sided hydronephrosis. Additional 5 mm right intrarenal stone. Punctate nonobstructing left renal stone.    Colonic diverticulosis. Stomach and small bowel unremarkable. The appendix appears normal. Aortic atherosclerotic changes without aneurysm.    Pelvis: Bladder nondistended. Prostate unremarkable. Ventral hernia lower abdominal wall containing omental fat, several loops of small bowel and the anterior aspect of the bladder.    Impression  5 mm proximal right ureteral stone with moderate right-sided hydronephrosis.    Nonobstructing right and left intrarenal stones.    Diffuse hepatic steatosis.    Lower midline abdominal wall hernia which appears to contain omental fat, several loops of small bowel and possibly the anterior aspect of the bladder.          Signer Name: OWEN Danielson MD  Signed: 10/23/2022 9:47 PM  Workstation Name: [email or web address]  [facility]       KUB: Results for orders placed during the hospital encounter of 10/26/22    XR Abdomen KUB    Narrative  EXAM:  XR Abdomen, 1 View    EXAM DATE:  10/26/2022 8:12 AM    CLINICAL  HISTORY:  stone; R10.9-Unspecified abdominal pain; N20.1-Calculus of ureter;  N20.0-Calculus of kidney    TECHNIQUE:  Frontal supine view of the abdomen/pelvis.    COMPARISON:  No relevant prior studies available.    FINDINGS:  GASTROINTESTINAL TRACT:  Unremarkable.  No dilation.  BONES/JOINTS:  Unremarkable.  VASCULATURE:  Vascular calcifications identified.  OTHER FINDINGS:  Probable mid distal right ureter 5 mm calculus.    Impression  Probable mid distal right ureter 5 mm calculus.    This report was finalized on 10/26/2022 8:24 AM by Dr. Bernardo Matthews MD.       Labs (past 3 months):      Admission on 10/23/2022, Discharged on 10/23/2022   Component Date Value Ref Range Status   • Extra Tube 10/23/2022 Hold for add-ons.   Final    Auto resulted.   • Extra Tube 10/23/2022 hold for add-on   Final    Auto resulted   • Extra Tube 10/23/2022 Hold for add-ons.   Final    Auto resulted.   • Extra Tube 10/23/2022 Hold for add-ons.   Final    Auto resulted   • Glucose 10/23/2022 171 (H)  65 - 99 mg/dL Final   • BUN 10/23/2022 14  8 - 23 mg/dL Final   • Creatinine 10/23/2022 0.97  0.76 - 1.27 mg/dL Final   • Sodium 10/23/2022 141  136 - 145 mmol/L Final   • Potassium 10/23/2022 4.0  3.5 - 5.2 mmol/L Final    Specimen hemolyzed.  Results may be affected. 2+ Hemolysis        • Chloride 10/23/2022 103  98 - 107 mmol/L Final   • CO2 10/23/2022 25.5  22.0 - 29.0 mmol/L Final   • Calcium 10/23/2022 9.3  8.6 - 10.5 mg/dL Final   • Total Protein 10/23/2022 6.8  6.0 - 8.5 g/dL Final   • Albumin 10/23/2022 4.08  3.50 - 5.20 g/dL Final   • ALT (SGPT) 10/23/2022 40  1 - 41 U/L Final    Specimen hemolyzed.  Results may be affected.   • AST (SGOT) 10/23/2022 24  1 - 40 U/L Final   • Alkaline Phosphatase 10/23/2022 98  39 - 117 U/L Final   • Total Bilirubin 10/23/2022 0.2  0.0 - 1.2 mg/dL Final   • Globulin 10/23/2022 2.7  gm/dL Final   • A/G Ratio 10/23/2022 1.5  g/dL Final   • BUN/Creatinine Ratio 10/23/2022 14.4  7.0 - 25.0 Final   •  Anion Gap 10/23/2022 12.5  5.0 - 15.0 mmol/L Final   • eGFR 10/23/2022 89.4  >60.0 mL/min/1.73 Final    National Kidney Foundation and American Society of Nephrology (ASN) Task Force recommended calculation based on the Chronic Kidney Disease Epidemiology Collaboration (CKD-EPI) equation refit without adjustment for race.   • Color, UA 10/23/2022 Dark Yellow (A)  Yellow, Straw Final   • Appearance, UA 10/23/2022 Cloudy (A)  Clear Final   • pH, UA 10/23/2022 6.5  5.0 - 8.0 Final   • Specific Gravity, UA 10/23/2022 >=1.030  1.005 - 1.030 Final   • Glucose, UA 10/23/2022 Negative  Negative Final   • Ketones, UA 10/23/2022 15 mg/dL (1+) (A)  Negative Final   • Bilirubin, UA 10/23/2022 Negative  Negative Final   • Blood, UA 10/23/2022 Negative  Negative Final   • Protein, UA 10/23/2022 Negative  Negative Final   • Leuk Esterase, UA 10/23/2022 Trace (A)  Negative Final   • Nitrite, UA 10/23/2022 Negative  Negative Final   • Urobilinogen, UA 10/23/2022 1.0 E.U./dL  0.2 - 1.0 E.U./dL Final   • C-Reactive Protein 10/23/2022 <0.30  0.00 - 0.50 mg/dL Final   • WBC 10/23/2022 9.25  3.40 - 10.80 10*3/mm3 Final   • RBC 10/23/2022 4.67  4.14 - 5.80 10*6/mm3 Final   • Hemoglobin 10/23/2022 14.9  13.0 - 17.7 g/dL Final   • Hematocrit 10/23/2022 42.2  37.5 - 51.0 % Final   • MCV 10/23/2022 90.4  79.0 - 97.0 fL Final   • MCH 10/23/2022 31.9  26.6 - 33.0 pg Final   • MCHC 10/23/2022 35.3  31.5 - 35.7 g/dL Final   • RDW 10/23/2022 12.0 (L)  12.3 - 15.4 % Final   • RDW-SD 10/23/2022 39.6  37.0 - 54.0 fl Final   • MPV 10/23/2022 9.4  6.0 - 12.0 fL Final   • Platelets 10/23/2022 262  140 - 450 10*3/mm3 Final   • Neutrophil % 10/23/2022 62.3  42.7 - 76.0 % Final   • Lymphocyte % 10/23/2022 25.6  19.6 - 45.3 % Final   • Monocyte % 10/23/2022 8.2  5.0 - 12.0 % Final   • Eosinophil % 10/23/2022 2.8  0.3 - 6.2 % Final   • Basophil % 10/23/2022 0.9  0.0 - 1.5 % Final   • Immature Grans % 10/23/2022 0.2  0.0 - 0.5 % Final   • Neutrophils,  Absolute 10/23/2022 5.76  1.70 - 7.00 10*3/mm3 Final   • Lymphocytes, Absolute 10/23/2022 2.37  0.70 - 3.10 10*3/mm3 Final   • Monocytes, Absolute 10/23/2022 0.76  0.10 - 0.90 10*3/mm3 Final   • Eosinophils, Absolute 10/23/2022 0.26  0.00 - 0.40 10*3/mm3 Final   • Basophils, Absolute 10/23/2022 0.08  0.00 - 0.20 10*3/mm3 Final   • Immature Grans, Absolute 10/23/2022 0.02  0.00 - 0.05 10*3/mm3 Final   • nRBC 10/23/2022 0.0  0.0 - 0.2 /100 WBC Final   • RBC, UA 10/23/2022 6-12 (A)  None Seen, 0-2 /HPF Final   • WBC, UA 10/23/2022 3-5 (A)  None Seen, 0-2 /HPF Final   • Bacteria, UA 10/23/2022 None Seen  None Seen /HPF Final   • Squamous Epithelial Cells, UA 10/23/2022 0-2  None Seen, 0-2 /HPF Final   • Hyaline Casts, UA 10/23/2022 3-6  None Seen /LPF Final   • Methodology 10/23/2022 Automated Microscopy   Final        Procedure:       Assessment/Plan:   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  We have discussed the various parameters regarding spontaneous passage including the notion that a 2 mm stone has a high likelihood of spontaneous passage versus a larger stone being caught up in the upper areas of the urinary tract.  We also discussed the medical management of stone disease and the use of medical expulsive therapy in the form of Flomax.  This is used in an off label setting.  We discussed the indicators for intervention including  absolute indicators such as sepsis and uncontrollable severe pain, as well as the relative indicators of moderate pain that is well-controlled with various analgesia.  I also talked about nonoperative management including ambulation and increasing fluids and hot tub as being an effective adjuncts in the treatment of a ureteral stone.  Now stone free            This document has been electronically signed by KAMALJIT DELACRUZ MD November 21, 2022 15:02 EST    Dictated Utilizing Dragon Dictation:  Part of this note may be an electronic transcription/translation of spoken language to printed text using the Dragon Dictation System.

## 2022-11-28 ENCOUNTER — TELEPHONE (OUTPATIENT)
Dept: UROLOGY | Facility: CLINIC | Age: 62
End: 2022-11-28

## 2022-11-28 NOTE — TELEPHONE ENCOUNTER
PA FOR [employer] HAS BEEN APPROVED   The request has been approved. The authorization is effective for a maximum of 12 fills from 11/28/2022 to 11/27/2023, as long as the member is enrolled in their current health plan. The request was approved for generic equivalent only. A written notification letter will follow with additional details.

## 2023-01-17 DIAGNOSIS — N20.1 RIGHT URETERAL STONE: ICD-10-CM

## 2023-01-17 RX ORDER — TAMSULOSIN HYDROCHLORIDE 0.4 MG/1
CAPSULE ORAL
Qty: 14 CAPSULE | Refills: 0 | Status: SHIPPED | OUTPATIENT
Start: 2023-01-17

## 2023-11-26 ENCOUNTER — HOSPITAL ENCOUNTER (EMERGENCY)
Facility: HOSPITAL | Age: 63
Discharge: HOME | End: 2023-11-26
Attending: STUDENT IN AN ORGANIZED HEALTH CARE EDUCATION/TRAINING PROGRAM | Admitting: STUDENT IN AN ORGANIZED HEALTH CARE EDUCATION/TRAINING PROGRAM
Payer: MEDICAID

## 2023-11-26 ENCOUNTER — APPOINTMENT (OUTPATIENT)
Dept: GENERAL RADIOLOGY | Facility: HOSPITAL | Age: 63
End: 2023-11-26
Payer: MEDICAID

## 2023-11-26 VITALS
SYSTOLIC BLOOD PRESSURE: 148 MMHG | BODY MASS INDEX: 34.27 KG/M2 | DIASTOLIC BLOOD PRESSURE: 77 MMHG | HEART RATE: 71 BPM | TEMPERATURE: 97.6 F | RESPIRATION RATE: 18 BRPM | HEIGHT: 72 IN | OXYGEN SATURATION: 94 % | WEIGHT: 253 LBS

## 2023-11-26 DIAGNOSIS — R06.02 SHORTNESS OF BREATH: Primary | ICD-10-CM

## 2023-11-26 DIAGNOSIS — R73.03 PREDIABETES: ICD-10-CM

## 2023-11-26 LAB
A-A DO2: 16.9 MMHG (ref 0–300)
ALBUMIN SERPL-MCNC: 4.1 G/DL (ref 3.5–5.2)
ALBUMIN/GLOB SERPL: 1.6 G/DL
ALP SERPL-CCNC: 78 U/L (ref 39–117)
ALT SERPL W P-5'-P-CCNC: 25 U/L (ref 1–41)
ANION GAP SERPL CALCULATED.3IONS-SCNC: 11.8 MMOL/L (ref 5–15)
APTT PPP: 29.4 SECONDS (ref 26.5–34.5)
ARTERIAL PATENCY WRIST A: ABNORMAL
AST SERPL-CCNC: 19 U/L (ref 1–40)
ATMOSPHERIC PRESS: 726 MMHG
BASE EXCESS BLDA CALC-SCNC: 1 MMOL/L (ref 0–2)
BASOPHILS # BLD AUTO: 0.05 10*3/MM3 (ref 0–0.2)
BASOPHILS NFR BLD AUTO: 0.8 % (ref 0–1.5)
BDY SITE: ABNORMAL
BILIRUB SERPL-MCNC: 0.4 MG/DL (ref 0–1.2)
BUN SERPL-MCNC: 14 MG/DL (ref 8–23)
BUN/CREAT SERPL: 15.2 (ref 7–25)
CALCIUM SPEC-SCNC: 9.3 MG/DL (ref 8.6–10.5)
CHLORIDE SERPL-SCNC: 102 MMOL/L (ref 98–107)
CO2 BLDA-SCNC: 26.4 MMOL/L (ref 22–33)
CO2 SERPL-SCNC: 24.2 MMOL/L (ref 22–29)
COHGB MFR BLD: 1.2 % (ref 0–5)
CREAT SERPL-MCNC: 0.92 MG/DL (ref 0.76–1.27)
CRP SERPL-MCNC: <0.3 MG/DL (ref 0–0.5)
D DIMER PPP FEU-MCNC: <0.27 MCGFEU/ML (ref 0–0.61)
DEPRECATED RDW RBC AUTO: 39.7 FL (ref 37–54)
EGFRCR SERPLBLD CKD-EPI 2021: 94.6 ML/MIN/1.73
EOSINOPHIL # BLD AUTO: 0.28 10*3/MM3 (ref 0–0.4)
EOSINOPHIL NFR BLD AUTO: 4.3 % (ref 0.3–6.2)
ERYTHROCYTE [DISTWIDTH] IN BLOOD BY AUTOMATED COUNT: 11.9 % (ref 12.3–15.4)
ERYTHROCYTE [SEDIMENTATION RATE] IN BLOOD: 2 MM/HR (ref 0–20)
FLUAV RNA RESP QL NAA+PROBE: NOT DETECTED
FLUBV RNA RESP QL NAA+PROBE: NOT DETECTED
GEN 5 2HR TROPONIN T REFLEX: <6 NG/L
GLOBULIN UR ELPH-MCNC: 2.5 GM/DL
GLUCOSE SERPL-MCNC: 203 MG/DL (ref 65–99)
HBA1C MFR BLD: 6.4 % (ref 4.8–5.6)
HCO3 BLDA-SCNC: 25.2 MMOL/L (ref 20–26)
HCT VFR BLD AUTO: 44.5 % (ref 37.5–51)
HCT VFR BLD CALC: 47.6 % (ref 38–51)
HGB BLD-MCNC: 14.9 G/DL (ref 13–17.7)
HGB BLDA-MCNC: 15.5 G/DL (ref 14–18)
HOLD SPECIMEN: NORMAL
HOLD SPECIMEN: NORMAL
IMM GRANULOCYTES # BLD AUTO: 0.02 10*3/MM3 (ref 0–0.05)
IMM GRANULOCYTES NFR BLD AUTO: 0.3 % (ref 0–0.5)
INHALED O2 CONCENTRATION: 21 %
INR PPP: 0.97 (ref 0.9–1.1)
LYMPHOCYTES # BLD AUTO: 2.04 10*3/MM3 (ref 0.7–3.1)
LYMPHOCYTES NFR BLD AUTO: 31.5 % (ref 19.6–45.3)
Lab: ABNORMAL
MAGNESIUM SERPL-MCNC: 1.9 MG/DL (ref 1.6–2.4)
MCH RBC QN AUTO: 30.5 PG (ref 26.6–33)
MCHC RBC AUTO-ENTMCNC: 33.5 G/DL (ref 31.5–35.7)
MCV RBC AUTO: 91 FL (ref 79–97)
METHGB BLD QL: 0 % (ref 0–3)
MODALITY: ABNORMAL
MONOCYTES # BLD AUTO: 0.37 10*3/MM3 (ref 0.1–0.9)
MONOCYTES NFR BLD AUTO: 5.7 % (ref 5–12)
NEUTROPHILS NFR BLD AUTO: 3.72 10*3/MM3 (ref 1.7–7)
NEUTROPHILS NFR BLD AUTO: 57.4 % (ref 42.7–76)
NRBC BLD AUTO-RTO: 0 /100 WBC (ref 0–0.2)
NT-PROBNP SERPL-MCNC: 57.3 PG/ML (ref 0–900)
OXYHGB MFR BLDV: 96.2 % (ref 94–99)
PCO2 BLDA: 38.2 MM HG (ref 35–45)
PCO2 TEMP ADJ BLD: ABNORMAL MM[HG]
PH BLDA: 7.43 PH UNITS (ref 7.35–7.45)
PH, TEMP CORRECTED: ABNORMAL
PLATELET # BLD AUTO: 223 10*3/MM3 (ref 140–450)
PMV BLD AUTO: 9.5 FL (ref 6–12)
PO2 BLDA: 82.4 MM HG (ref 83–108)
PO2 TEMP ADJ BLD: ABNORMAL MM[HG]
POTASSIUM SERPL-SCNC: 4.1 MMOL/L (ref 3.5–5.2)
PROCALCITONIN SERPL-MCNC: 0.05 NG/ML (ref 0–0.25)
PROT SERPL-MCNC: 6.6 G/DL (ref 6–8.5)
PROTHROMBIN TIME: 13.4 SECONDS (ref 12.1–14.7)
QT INTERVAL: 374 MS
QTC INTERVAL: 406 MS
RBC # BLD AUTO: 4.89 10*6/MM3 (ref 4.14–5.8)
SAO2 % BLDCOA: 97.4 % (ref 94–99)
SARS-COV-2 RNA RESP QL NAA+PROBE: NOT DETECTED
SODIUM SERPL-SCNC: 138 MMOL/L (ref 136–145)
TROPONIN T DELTA: NORMAL
TROPONIN T SERPL HS-MCNC: <6 NG/L
VENTILATOR MODE: ABNORMAL
WBC NRBC COR # BLD AUTO: 6.48 10*3/MM3 (ref 3.4–10.8)
WHOLE BLOOD HOLD COAG: NORMAL
WHOLE BLOOD HOLD SPECIMEN: NORMAL

## 2023-11-26 PROCEDURE — 84145 PROCALCITONIN (PCT): CPT | Performed by: PHYSICIAN ASSISTANT

## 2023-11-26 PROCEDURE — 93005 ELECTROCARDIOGRAM TRACING: CPT | Performed by: STUDENT IN AN ORGANIZED HEALTH CARE EDUCATION/TRAINING PROGRAM

## 2023-11-26 PROCEDURE — 80053 COMPREHEN METABOLIC PANEL: CPT | Performed by: PHYSICIAN ASSISTANT

## 2023-11-26 PROCEDURE — 87636 SARSCOV2 & INF A&B AMP PRB: CPT | Performed by: PHYSICIAN ASSISTANT

## 2023-11-26 PROCEDURE — 36600 WITHDRAWAL OF ARTERIAL BLOOD: CPT

## 2023-11-26 PROCEDURE — 86140 C-REACTIVE PROTEIN: CPT | Performed by: PHYSICIAN ASSISTANT

## 2023-11-26 PROCEDURE — 82375 ASSAY CARBOXYHB QUANT: CPT

## 2023-11-26 PROCEDURE — 85652 RBC SED RATE AUTOMATED: CPT | Performed by: PHYSICIAN ASSISTANT

## 2023-11-26 PROCEDURE — 94640 AIRWAY INHALATION TREATMENT: CPT

## 2023-11-26 PROCEDURE — 85730 THROMBOPLASTIN TIME PARTIAL: CPT | Performed by: PHYSICIAN ASSISTANT

## 2023-11-26 PROCEDURE — 36415 COLL VENOUS BLD VENIPUNCTURE: CPT

## 2023-11-26 PROCEDURE — 94799 UNLISTED PULMONARY SVC/PX: CPT

## 2023-11-26 PROCEDURE — 83050 HGB METHEMOGLOBIN QUAN: CPT

## 2023-11-26 PROCEDURE — 71045 X-RAY EXAM CHEST 1 VIEW: CPT | Performed by: RADIOLOGY

## 2023-11-26 PROCEDURE — 83036 HEMOGLOBIN GLYCOSYLATED A1C: CPT | Performed by: PHYSICIAN ASSISTANT

## 2023-11-26 PROCEDURE — 85610 PROTHROMBIN TIME: CPT | Performed by: PHYSICIAN ASSISTANT

## 2023-11-26 PROCEDURE — 83880 ASSAY OF NATRIURETIC PEPTIDE: CPT | Performed by: PHYSICIAN ASSISTANT

## 2023-11-26 PROCEDURE — 82805 BLOOD GASES W/O2 SATURATION: CPT

## 2023-11-26 PROCEDURE — 93010 ELECTROCARDIOGRAM REPORT: CPT | Performed by: INTERNAL MEDICINE

## 2023-11-26 PROCEDURE — 85379 FIBRIN DEGRADATION QUANT: CPT | Performed by: PHYSICIAN ASSISTANT

## 2023-11-26 PROCEDURE — 71045 X-RAY EXAM CHEST 1 VIEW: CPT

## 2023-11-26 PROCEDURE — 83735 ASSAY OF MAGNESIUM: CPT | Performed by: PHYSICIAN ASSISTANT

## 2023-11-26 PROCEDURE — 85025 COMPLETE CBC W/AUTO DIFF WBC: CPT | Performed by: PHYSICIAN ASSISTANT

## 2023-11-26 PROCEDURE — 99284 EMERGENCY DEPT VISIT MOD MDM: CPT

## 2023-11-26 PROCEDURE — 84484 ASSAY OF TROPONIN QUANT: CPT | Performed by: PHYSICIAN ASSISTANT

## 2023-11-26 RX ORDER — SODIUM CHLORIDE 0.9 % (FLUSH) 0.9 %
10 SYRINGE (ML) INJECTION AS NEEDED
Status: DISCONTINUED | OUTPATIENT
Start: 2023-11-26 | End: 2023-11-26 | Stop reason: HOSPADM

## 2023-11-26 RX ORDER — IPRATROPIUM BROMIDE AND ALBUTEROL SULFATE 2.5; .5 MG/3ML; MG/3ML
3 SOLUTION RESPIRATORY (INHALATION) ONCE
Status: COMPLETED | OUTPATIENT
Start: 2023-11-26 | End: 2023-11-26

## 2023-11-26 RX ORDER — ALBUTEROL SULFATE 90 UG/1
2 AEROSOL, METERED RESPIRATORY (INHALATION) EVERY 4 HOURS PRN
Qty: 6.7 G | Refills: 0 | Status: SHIPPED | OUTPATIENT
Start: 2023-11-26

## 2023-11-26 RX ADMIN — IPRATROPIUM BROMIDE AND ALBUTEROL SULFATE 3 ML: .5; 2.5 SOLUTION RESPIRATORY (INHALATION) at 09:30

## 2023-11-26 NOTE — ED PROVIDER NOTES
"Subjective   History of Present Illness  63-year-old male who presents to the ED today for shortness of breath.  He describes it as \"smothering.\"  He states this has been going on for about 1 week.  He states it started after he was working in the [employer] in [place].  He states he feels like his symptoms are not improving.  He states he has had a mild intermittent cough but it is not constant.  He denies any fever.  He states he has had some intermittent substernal chest pressure.  He denies any lower extremity edema.  He states his symptoms are worse with certain smells such as smoke or perfume.  He is not currently a smoker.  He states he quit smoking about 16 or 17 years ago.  He states he is not currently on any daily medications.  He has no history of COPD or asthma.    History provided by:  Patient  Shortness of Breath  Severity:  Moderate  Onset quality:  Gradual  Duration:  1 week  Timing:  Constant  Progression:  Unchanged  Chronicity:  New  Context: smoke exposure    Relieved by:  Nothing  Associated symptoms: chest pain and cough    Associated symptoms: no abdominal pain, no diaphoresis, no ear pain, no fever, no headaches, no hemoptysis, no neck pain, no PND, no rash, no sore throat, no sputum production, no syncope, no swollen glands, no vomiting and no wheezing    Risk factors: obesity    Risk factors: no tobacco use        Review of Systems   Constitutional: Negative.  Negative for diaphoresis and fever.   HENT: Negative.  Negative for ear pain and sore throat.    Eyes: Negative.    Respiratory:  Positive for cough and shortness of breath. Negative for hemoptysis, sputum production, wheezing and stridor.    Cardiovascular:  Positive for chest pain. Negative for palpitations, leg swelling, syncope and PND.   Gastrointestinal: Negative.  Negative for abdominal pain and vomiting.   Genitourinary: Negative.    Musculoskeletal: Negative.  Negative for neck pain.   Skin: Negative.  Negative for rash. "   Neurological: Negative.  Negative for headaches.   Psychiatric/Behavioral: Negative.     All other systems reviewed and are negative.      Past Medical History:   Diagnosis Date    Arthritis     Kidney stone        No Known Allergies    Past Surgical History:   Procedure Laterality Date    CHOLECYSTECTOMY  20 years    HERNIA REPAIR      KIDNEY STONE SURGERY      ORCHIECTOMY  2020    URETEROSCOPY LASER LITHOTRIPSY WITH STENT INSERTION Right 10/26/2022    Procedure: URETEROSCOPY LASER LITHOTRIPSY RETROGRADE PYELOGRAM WITH STENT PLACEMENT;  Surgeon: Holland Moya MD;  Location: [facility];  Service: Urology;  Laterality: Right;       Family History   Problem Relation Age of Onset    Cancer Father        Social History     Socioeconomic History    Marital status:    Tobacco Use    Smoking status: Former     Packs/day: 2.00     Years: 20.00     Additional pack years: 0.00     Total pack years: 40.00     Types: Cigarettes     Quit date: 1999     Years since quittin.9    Smokeless tobacco: Former     Types: Snuff, Chew   Substance and Sexual Activity    Alcohol use: No    Drug use: No    Sexual activity: Yes     Partners: Female     Birth control/protection: None           Objective   Physical Exam  Vitals and nursing note reviewed.   Constitutional:       General: He is not in acute distress.     Appearance: He is well-developed. He is obese. He is not diaphoretic.   HENT:      Head: Normocephalic and atraumatic.   Eyes:      Extraocular Movements: Extraocular movements intact.      Pupils: Pupils are equal, round, and reactive to light.   Neck:      Vascular: No JVD.      Trachea: No tracheal deviation.   Cardiovascular:      Rate and Rhythm: Normal rate and regular rhythm.      Pulses: Normal pulses.      Heart sounds: Normal heart sounds.   Pulmonary:      Effort: Pulmonary effort is normal.      Breath sounds: Normal breath sounds.   Chest:      Chest wall: No tenderness.   Abdominal:       General: Bowel sounds are normal.      Palpations: Abdomen is soft.      Tenderness: There is no abdominal tenderness.   Musculoskeletal:         General: Normal range of motion.      Cervical back: Normal range of motion and neck supple.      Right lower leg: No edema.      Left lower leg: No edema.   Lymphadenopathy:      Cervical: No cervical adenopathy.   Skin:     General: Skin is warm and dry.      Capillary Refill: Capillary refill takes less than 2 seconds.   Neurological:      General: No focal deficit present.      Mental Status: He is alert and oriented to person, place, and time.   Psychiatric:         Mood and Affect: Mood normal.         Procedures       Results for orders placed or performed during the hospital encounter of 11/26/23   COVID-19 and FLU A/B PCR, 1 HR TAT - Swab, Nasopharynx    Specimen: Nasopharynx; Swab   Result Value Ref Range    COVID19 Not Detected Not Detected - Ref. Range    Influenza A PCR Not Detected Not Detected    Influenza B PCR Not Detected Not Detected   Comprehensive Metabolic Panel    Specimen: Blood   Result Value Ref Range    Glucose 203 (H) 65 - 99 mg/dL    BUN 14 8 - 23 mg/dL    Creatinine 0.92 0.76 - 1.27 mg/dL    Sodium 138 136 - 145 mmol/L    Potassium 4.1 3.5 - 5.2 mmol/L    Chloride 102 98 - 107 mmol/L    CO2 24.2 22.0 - 29.0 mmol/L    Calcium 9.3 8.6 - 10.5 mg/dL    Total Protein 6.6 6.0 - 8.5 g/dL    Albumin 4.1 3.5 - 5.2 g/dL    ALT (SGPT) 25 1 - 41 U/L    AST (SGOT) 19 1 - 40 U/L    Alkaline Phosphatase 78 39 - 117 U/L    Total Bilirubin 0.4 0.0 - 1.2 mg/dL    Globulin 2.5 gm/dL    A/G Ratio 1.6 g/dL    BUN/Creatinine Ratio 15.2 7.0 - 25.0    Anion Gap 11.8 5.0 - 15.0 mmol/L    eGFR 94.6 >60.0 mL/min/1.73   Protime-INR    Specimen: Blood   Result Value Ref Range    Protime 13.4 12.1 - 14.7 Seconds    INR 0.97 0.90 - 1.10   aPTT    Specimen: Blood   Result Value Ref Range    PTT 29.4 26.5 - 34.5 seconds   BNP    Specimen: Blood   Result Value Ref Range     proBNP 57.3 0.0 - 900.0 pg/mL   High Sensitivity Troponin T    Specimen: Blood   Result Value Ref Range    HS Troponin T <6 <22 ng/L   Procalcitonin    Specimen: Blood   Result Value Ref Range    Procalcitonin 0.05 0.00 - 0.25 ng/mL   Sedimentation Rate    Specimen: Blood   Result Value Ref Range    Sed Rate 2 0 - 20 mm/hr   C-reactive Protein    Specimen: Blood   Result Value Ref Range    C-Reactive Protein <0.30 0.00 - 0.50 mg/dL   Magnesium    Specimen: Blood   Result Value Ref Range    Magnesium 1.9 1.6 - 2.4 mg/dL   CBC Auto Differential    Specimen: Blood   Result Value Ref Range    WBC 6.48 3.40 - 10.80 10*3/mm3    RBC 4.89 4.14 - 5.80 10*6/mm3    Hemoglobin 14.9 13.0 - 17.7 g/dL    Hematocrit 44.5 37.5 - 51.0 %    MCV 91.0 79.0 - 97.0 fL    MCH 30.5 26.6 - 33.0 pg    MCHC 33.5 31.5 - 35.7 g/dL    RDW 11.9 (L) 12.3 - 15.4 %    RDW-SD 39.7 37.0 - 54.0 fl    MPV 9.5 6.0 - 12.0 fL    Platelets 223 140 - 450 10*3/mm3    Neutrophil % 57.4 42.7 - 76.0 %    Lymphocyte % 31.5 19.6 - 45.3 %    Monocyte % 5.7 5.0 - 12.0 %    Eosinophil % 4.3 0.3 - 6.2 %    Basophil % 0.8 0.0 - 1.5 %    Immature Grans % 0.3 0.0 - 0.5 %    Neutrophils, Absolute 3.72 1.70 - 7.00 10*3/mm3    Lymphocytes, Absolute 2.04 0.70 - 3.10 10*3/mm3    Monocytes, Absolute 0.37 0.10 - 0.90 10*3/mm3    Eosinophils, Absolute 0.28 0.00 - 0.40 10*3/mm3    Basophils, Absolute 0.05 0.00 - 0.20 10*3/mm3    Immature Grans, Absolute 0.02 0.00 - 0.05 10*3/mm3    nRBC 0.0 0.0 - 0.2 /100 WBC   Blood Gas, Arterial With Co-Ox    Specimen: Arterial Blood   Result Value Ref Range    Site Left Radial     Ricardo's Test N/A     pH, Arterial 7.428 7.350 - 7.450 pH units    pCO2, Arterial 38.2 35.0 - 45.0 mm Hg    pO2, Arterial 82.4 (L) 83.0 - 108.0 mm Hg    HCO3, Arterial 25.2 20.0 - 26.0 mmol/L    Base Excess, Arterial 1.0 0.0 - 2.0 mmol/L    O2 Saturation, Arterial 97.4 94.0 - 99.0 %    Hemoglobin, Blood Gas 15.5 14 - 18 g/dL    Hematocrit, Blood Gas 47.6 38.0 - 51.0 %     Oxyhemoglobin 96.2 94 - 99 %    Methemoglobin 0.00 0.00 - 3.00 %    Carboxyhemoglobin 1.2 0 - 5 %    A-a DO2 16.9 0.0 - 300.0 mmHg    CO2 Content 26.4 22 - 33 mmol/L    Barometric Pressure for Blood Gas 726 mmHg    Modality Room Air     FIO2 21 %    Ventilator Mode NA     Collected by [id number]     pH, Temp Corrected      pCO2, Temperature Corrected      pO2, Temperature Corrected     High Sensitivity Troponin T 2Hr    Specimen: Blood   Result Value Ref Range    HS Troponin T <6 <22 ng/L    Troponin T Delta     D-dimer, Quantitative    Specimen: Blood   Result Value Ref Range    D-Dimer, Quantitative <0.27 0.00 - 0.61 MCGFEU/mL   Hemoglobin A1c    Specimen: Blood   Result Value Ref Range    Hemoglobin A1C 6.40 (H) 4.80 - 5.60 %   ECG 12 Lead Dyspnea   Result Value Ref Range    QT Interval 374 ms    QTC Interval 406 ms   Green Top (Gel)   Result Value Ref Range    Extra Tube Hold for add-ons.    Lavender Top   Result Value Ref Range    Extra Tube hold for add-on    Gold Top - SST   Result Value Ref Range    Extra Tube Hold for add-ons.    Light Blue Top   Result Value Ref Range    Extra Tube Hold for add-ons.           ED Course  ED Course as of 11/26/23 1218   Sun Nov 26, 2023   0908 ECG 12 Lead Dyspnea  Sinus rhythm old septal infarct.  No acute ischemia.  Rate 71  QRS 90 QTc 406.  Electronically signed by Kina Asif DO, 11/26/23, 9:08 AM EST.   [LK]   1026 XR Chest 1 View  FINDINGS: Lungs are clear and there is no pleural effusion or  pneumothorax.  Cardiomediastinal silhouette is normal.  No acute osseous  or upper abdominal abnormality is seen.     IMPRESSION:     NO ACUTE ABNORMALITY IN THE CHEST.   [AH]      ED Course User Index  [AH] Ivis nSyder, PA  [LK] Kina Asif DO                                           Medical Decision Making  63-year-old male who presents to the ED today for shortness of breath.  This has been going on for about a week.  Chest x-ray was unremarkable.  Troponins were  negative.  D-dimer was negative.  proBNP was negative.  Plan is for discharge home and he will be scheduled for an outpatient stress test.  He will also be prescribed an albuterol inhaler.  He was found to be prediabetic.  His blood sugar was 203 with a hemoglobin A1c of 6.4 in the ED.  He was advised to follow closely with his primary care provider for further treatment for this.  He was advised to return to the ED at any time if symptoms change or worsen.    Problems Addressed:  Prediabetes: complicated acute illness or injury  Shortness of breath: complicated acute illness or injury    Amount and/or Complexity of Data Reviewed  Labs: ordered.  Radiology: ordered. Decision-making details documented in ED Course.  ECG/medicine tests: ordered. Decision-making details documented in ED Course.    Risk  Prescription drug management.        Final diagnoses:   Shortness of breath   Prediabetes       ED Disposition  ED Disposition       ED Disposition   Discharge    Condition   Stable    Comment   --               Biju Romero MD  [address] [place] [address] [address] [place] [address] [phone]    Call in 1 day           Medication List        New Prescriptions      albuterol sulfate  (90 Base) MCG/ACT inhaler  Commonly known as: PROVENTIL HFA;VENTOLIN HFA;PROAIR HFA  Inhale 2 puffs Every 4 (Four) Hours As Needed for Wheezing or Shortness of Air.            Stop      HYDROcodone-acetaminophen  MG per tablet  Commonly known as: NORCO     ketorolac 10 MG tablet  Commonly known as: TORADOL     tamsulosin 0.4 MG capsule 24 hr capsule  Commonly known as: FLOMAX               Where to Get Your Medications        These medications were sent to [facility] [address] [place] [address] - [address] [place] [address] - [phone] [facility] [phone] [address] [address]      Phone: [phone]   albuterol sulfate  (90 Base) MCG/ACT inhaler            Ivis Snyder PA  11/26/23 6249     No adenopathy or splenomegaly. No cervical or inguinal lymphadenopathy.

## 2023-11-26 NOTE — DISCHARGE INSTRUCTIONS
You are being scheduled for an outpatient stress test.  Scheduling will call you with the date and time of your test.  A prescription for an albuterol inhaler has been sent to your pharmacy.  Use this as needed for your shortness of breath.  Follow-up with your family doctor in the office at the next available appointment.  Return to the ED at any time if symptoms change or worsen.

## 2023-11-27 ENCOUNTER — TRANSCRIBE ORDERS (OUTPATIENT)
Dept: ADMINISTRATIVE | Facility: HOSPITAL | Age: 63
End: 2023-11-27
Payer: MEDICAID

## 2023-11-27 DIAGNOSIS — R06.02 SHORTNESS OF BREATH: Primary | ICD-10-CM

## 2025-05-17 ENCOUNTER — HOSPITAL ENCOUNTER (EMERGENCY)
Facility: HOSPITAL | Age: 65
Discharge: HOME | End: 2025-05-18
Payer: MEDICAID

## 2025-05-17 ENCOUNTER — APPOINTMENT (OUTPATIENT)
Dept: CT IMAGING | Facility: HOSPITAL | Age: 65
End: 2025-05-17
Payer: MEDICAID

## 2025-05-17 DIAGNOSIS — N20.0 KIDNEY STONE ON LEFT SIDE: Primary | ICD-10-CM

## 2025-05-17 LAB
ALBUMIN SERPL-MCNC: 4.8 G/DL (ref 3.5–5.2)
ALBUMIN/GLOB SERPL: 1.5 G/DL
ALP SERPL-CCNC: 86 U/L (ref 39–117)
ALT SERPL W P-5'-P-CCNC: 42 U/L (ref 1–41)
ANION GAP SERPL CALCULATED.3IONS-SCNC: 12.1 MMOL/L (ref 5–15)
ANION GAP SERPL CALCULATED.3IONS-SCNC: 12.1 MMOL/L (ref 5–15)
AST SERPL-CCNC: 29 U/L (ref 1–40)
BACTERIA UR QL AUTO: ABNORMAL /HPF
BASOPHILS # BLD AUTO: 0.05 10*3/MM3 (ref 0–0.2)
BASOPHILS NFR BLD AUTO: 0.4 % (ref 0–1.5)
BILIRUB SERPL-MCNC: 0.6 MG/DL (ref 0–1.2)
BILIRUB UR QL STRIP: NEGATIVE
BUN SERPL-MCNC: 18 MG/DL (ref 8–23)
BUN SERPL-MCNC: 20 MG/DL (ref 8–23)
BUN/CREAT SERPL: 14.8 (ref 7–25)
BUN/CREAT SERPL: 15.7 (ref 7–25)
CALCIUM SPEC-SCNC: 10.3 MG/DL (ref 8.6–10.5)
CALCIUM SPEC-SCNC: 9.4 MG/DL (ref 8.6–10.5)
CHLORIDE SERPL-SCNC: 104 MMOL/L (ref 98–107)
CHLORIDE SERPL-SCNC: 105 MMOL/L (ref 98–107)
CLARITY UR: CLEAR
CO2 SERPL-SCNC: 22.9 MMOL/L (ref 22–29)
CO2 SERPL-SCNC: 25.9 MMOL/L (ref 22–29)
COLOR UR: ABNORMAL
CREAT SERPL-MCNC: 1.15 MG/DL (ref 0.76–1.27)
CREAT SERPL-MCNC: 1.35 MG/DL (ref 0.76–1.27)
D-LACTATE SERPL-SCNC: 2.4 MMOL/L (ref 0.5–2)
D-LACTATE SERPL-SCNC: 2.6 MMOL/L (ref 0.5–2)
DEPRECATED RDW RBC AUTO: 39.2 FL (ref 37–54)
EGFRCR SERPLBLD CKD-EPI 2021: 59.4 ML/MIN/1.73
EGFRCR SERPLBLD CKD-EPI 2021: 72 ML/MIN/1.73
EOSINOPHIL # BLD AUTO: 0.01 10*3/MM3 (ref 0–0.4)
EOSINOPHIL NFR BLD AUTO: 0.1 % (ref 0.3–6.2)
ERYTHROCYTE [DISTWIDTH] IN BLOOD BY AUTOMATED COUNT: 11.7 % (ref 12.3–15.4)
GLOBULIN UR ELPH-MCNC: 3.2 GM/DL
GLUCOSE SERPL-MCNC: 133 MG/DL (ref 65–99)
GLUCOSE SERPL-MCNC: 159 MG/DL (ref 65–99)
GLUCOSE UR STRIP-MCNC: NEGATIVE MG/DL
HCT VFR BLD AUTO: 46.6 % (ref 37.5–51)
HGB BLD-MCNC: 15.5 G/DL (ref 13–17.7)
HGB UR QL STRIP.AUTO: ABNORMAL
HOLD SPECIMEN: NORMAL
HOLD SPECIMEN: NORMAL
HYALINE CASTS UR QL AUTO: ABNORMAL /LPF
IMM GRANULOCYTES # BLD AUTO: 0.06 10*3/MM3 (ref 0–0.05)
IMM GRANULOCYTES NFR BLD AUTO: 0.4 % (ref 0–0.5)
KETONES UR QL STRIP: ABNORMAL
LEUKOCYTE ESTERASE UR QL STRIP.AUTO: ABNORMAL
LIPASE SERPL-CCNC: 18 U/L (ref 13–60)
LYMPHOCYTES # BLD AUTO: 1.21 10*3/MM3 (ref 0.7–3.1)
LYMPHOCYTES NFR BLD AUTO: 8.9 % (ref 19.6–45.3)
MCH RBC QN AUTO: 30.3 PG (ref 26.6–33)
MCHC RBC AUTO-ENTMCNC: 33.3 G/DL (ref 31.5–35.7)
MCV RBC AUTO: 91.2 FL (ref 79–97)
MONOCYTES # BLD AUTO: 0.46 10*3/MM3 (ref 0.1–0.9)
MONOCYTES NFR BLD AUTO: 3.4 % (ref 5–12)
NEUTROPHILS NFR BLD AUTO: 11.73 10*3/MM3 (ref 1.7–7)
NEUTROPHILS NFR BLD AUTO: 86.8 % (ref 42.7–76)
NITRITE UR QL STRIP: NEGATIVE
NRBC BLD AUTO-RTO: 0 /100 WBC (ref 0–0.2)
PH UR STRIP.AUTO: 5.5 [PH] (ref 5–8)
PLATELET # BLD AUTO: 299 10*3/MM3 (ref 140–450)
PMV BLD AUTO: 9.7 FL (ref 6–12)
POTASSIUM SERPL-SCNC: 4.2 MMOL/L (ref 3.5–5.2)
POTASSIUM SERPL-SCNC: 4.5 MMOL/L (ref 3.5–5.2)
PROT SERPL-MCNC: 8 G/DL (ref 6–8.5)
PROT UR QL STRIP: ABNORMAL
RBC # BLD AUTO: 5.11 10*6/MM3 (ref 4.14–5.8)
RBC # UR STRIP: ABNORMAL /HPF
REF LAB TEST METHOD: ABNORMAL
SODIUM SERPL-SCNC: 140 MMOL/L (ref 136–145)
SODIUM SERPL-SCNC: 142 MMOL/L (ref 136–145)
SP GR UR STRIP: 1.03 (ref 1–1.03)
SQUAMOUS #/AREA URNS HPF: ABNORMAL /HPF
UROBILINOGEN UR QL STRIP: ABNORMAL
WBC # UR STRIP: ABNORMAL /HPF
WBC NRBC COR # BLD AUTO: 13.52 10*3/MM3 (ref 3.4–10.8)
WHOLE BLOOD HOLD COAG: NORMAL
WHOLE BLOOD HOLD SPECIMEN: NORMAL

## 2025-05-17 PROCEDURE — 83605 ASSAY OF LACTIC ACID: CPT

## 2025-05-17 PROCEDURE — 74178 CT ABD&PLV WO CNTR FLWD CNTR: CPT

## 2025-05-17 PROCEDURE — 25810000003 LACTATED RINGERS SOLUTION

## 2025-05-17 PROCEDURE — 96361 HYDRATE IV INFUSION ADD-ON: CPT

## 2025-05-17 PROCEDURE — 96376 TX/PRO/DX INJ SAME DRUG ADON: CPT

## 2025-05-17 PROCEDURE — 96375 TX/PRO/DX INJ NEW DRUG ADDON: CPT

## 2025-05-17 PROCEDURE — 25010000002 ONDANSETRON PER 1 MG

## 2025-05-17 PROCEDURE — 80053 COMPREHEN METABOLIC PANEL: CPT

## 2025-05-17 PROCEDURE — 99285 EMERGENCY DEPT VISIT HI MDM: CPT

## 2025-05-17 PROCEDURE — 96374 THER/PROPH/DIAG INJ IV PUSH: CPT

## 2025-05-17 PROCEDURE — 25010000002 MORPHINE PER 10 MG

## 2025-05-17 PROCEDURE — 83690 ASSAY OF LIPASE: CPT

## 2025-05-17 PROCEDURE — 25510000001 IOPAMIDOL 61 % SOLUTION

## 2025-05-17 PROCEDURE — 85025 COMPLETE CBC W/AUTO DIFF WBC: CPT

## 2025-05-17 PROCEDURE — 36415 COLL VENOUS BLD VENIPUNCTURE: CPT

## 2025-05-17 PROCEDURE — 81001 URINALYSIS AUTO W/SCOPE: CPT

## 2025-05-17 RX ORDER — SODIUM CHLORIDE 0.9 % (FLUSH) 0.9 %
10 SYRINGE (ML) INJECTION AS NEEDED
Status: DISCONTINUED | OUTPATIENT
Start: 2025-05-17 | End: 2025-05-18 | Stop reason: HOSPADM

## 2025-05-17 RX ORDER — ONDANSETRON 2 MG/ML
4 INJECTION INTRAMUSCULAR; INTRAVENOUS ONCE
Status: COMPLETED | OUTPATIENT
Start: 2025-05-17 | End: 2025-05-17

## 2025-05-17 RX ORDER — IOPAMIDOL 612 MG/ML
100 INJECTION, SOLUTION INTRAVASCULAR
Status: COMPLETED | OUTPATIENT
Start: 2025-05-17 | End: 2025-05-17

## 2025-05-17 RX ADMIN — IOPAMIDOL 90 ML: 612 INJECTION, SOLUTION INTRAVENOUS at 20:51

## 2025-05-17 RX ADMIN — SODIUM CHLORIDE, POTASSIUM CHLORIDE, SODIUM LACTATE AND CALCIUM CHLORIDE 1500 ML: 600; 310; 30; 20 INJECTION, SOLUTION INTRAVENOUS at 19:39

## 2025-05-17 RX ADMIN — MORPHINE SULFATE 4 MG: 4 INJECTION, SOLUTION INTRAMUSCULAR; INTRAVENOUS at 19:46

## 2025-05-17 RX ADMIN — MORPHINE SULFATE 4 MG: 4 INJECTION, SOLUTION INTRAMUSCULAR; INTRAVENOUS at 22:50

## 2025-05-17 RX ADMIN — ONDANSETRON 4 MG: 2 INJECTION INTRAMUSCULAR; INTRAVENOUS at 19:46

## 2025-05-17 NOTE — ED PROVIDER NOTES
Subjective   History of Present Illness  Presents with acute onset left flank pain radiating into his groin.  Patient states that it waxes and wanes.  He had associated nausea and vomiting.  The patient states that he has had previous kidney stones and this feels very similar to that.  The patient states that is no exacerbating or relieving factors.  Patient states that he has terminal dribbling but otherwise has no other urinary symptoms.  Patient states that he has been having chronic off-and-on pain in his right flank but this is not an issue today.      Review of Systems   Constitutional:  Positive for activity change and appetite change. Negative for chills, diaphoresis and fever.   HENT: Negative.     Respiratory: Negative.     Cardiovascular: Negative.    Gastrointestinal:  Positive for abdominal pain, nausea and vomiting. Negative for abdominal distention, constipation and diarrhea.   Genitourinary:  Positive for decreased urine volume, difficulty urinating and flank pain. Negative for dysuria, frequency, hematuria and urgency.   Skin: Negative.    Neurological: Negative.    Psychiatric/Behavioral: Negative.         Past Medical History:   Diagnosis Date    Arthritis     Kidney stone        No Known Allergies    Past Surgical History:   Procedure Laterality Date    CHOLECYSTECTOMY  20 years    HERNIA REPAIR      KIDNEY STONE SURGERY      ORCHIECTOMY  03/2020    URETEROSCOPY LASER LITHOTRIPSY WITH STENT INSERTION Right 10/26/2022    Procedure: URETEROSCOPY LASER LITHOTRIPSY RETROGRADE PYELOGRAM WITH STENT PLACEMENT;  Surgeon: Holland Moya MD;  Location: [facility];  Service: Urology;  Laterality: Right;       Family History   Problem Relation Age of Onset    Cancer Father        Social History     Socioeconomic History    Marital status:    Tobacco Use    Smoking status: Former     Current packs/day: 0.00     Average packs/day: 2.0 packs/day for 20.0 years (40.0 ttl pk-yrs)     Types:  Cigarettes     Start date: 1979     Quit date: 1999     Years since quittin.3    Smokeless tobacco: Former     Types: Snuff, Chew   Substance and Sexual Activity    Alcohol use: No    Drug use: No    Sexual activity: Yes     Partners: Female     Birth control/protection: None           Objective   Physical Exam  Vitals and nursing note reviewed.   Constitutional:       Appearance: He is obese. He is ill-appearing. He is not toxic-appearing or diaphoretic.   HENT:      Head: Normocephalic and atraumatic.   Eyes:      General:         Right eye: No discharge.         Left eye: No discharge.      Extraocular Movements: Extraocular movements intact.      Conjunctiva/sclera: Conjunctivae normal.      Pupils: Pupils are equal, round, and reactive to light.   Cardiovascular:      Rate and Rhythm: Normal rate and regular rhythm.   Abdominal:      General: Abdomen is protuberant. There is no distension.      Palpations: Abdomen is soft. There is no mass.      Tenderness: There is abdominal tenderness. There is left CVA tenderness and guarding. There is no right CVA tenderness or rebound.      Hernia: No hernia is present.       Skin:     Coloration: Skin is not jaundiced.      Findings: No bruising, erythema or rash.   Neurological:      General: No focal deficit present.      Mental Status: He is alert and oriented to person, place, and time. Mental status is at baseline.      Cranial Nerves: No cranial nerve deficit.   Psychiatric:         Behavior: Behavior normal.         Thought Content: Thought content normal.         Judgment: Judgment normal.         Procedures           ED Course  ED Course as of 25 0044   Sat May 17, 2025   1914 Creatinine(!): 1.35 [RA]   191 Glucose(!): 159 [RA]   1915 ALT (SGPT)(!): 42 [RA]   1915 Lactate(!!): 2.6 [RA]   191 WBC(!): 13.52 [RA]   2119 Blood, UA(!): Large (3+) [RA]    Protein, UA(!): Trace [RA]    Leukocytes, UA(!): Trace [RA]    Creatinine: 1.15  [RA]   Sun May 18, 2025   0041 CT Abdomen Pelvis With & Without Contrast  Left kidney stone [RA]      ED Course User Index  [RA] Alvin Danielson MD                                                       Medical Decision Making  Patient presents today with left CVA/flank tenderness.  The differential diagnoses include pyelonephritis, pyelitis, MSK, colitis, diverticulitis, kidney stone.    The patient had an appropriate workup in the emergency department and has been diagnosed with left kidney stone.  The patient is being prescribed Flomax and analgesia for outpatient management.    The patient should follow-up with Dr. Moya in the outpatient setting and should follow-up with family doc as needed.    Problems Addressed:  Kidney stone on left side: complicated acute illness or injury    Amount and/or Complexity of Data Reviewed  Labs: ordered. Decision-making details documented in ED Course.  Radiology: ordered. Decision-making details documented in ED Course.    Risk  OTC drugs.  Prescription drug management.        Final diagnoses:   Kidney stone on left side       ED Disposition  ED Disposition       ED Disposition   Discharge    Condition   Stable    Comment   --               Bjiu Romero MD  [address] [place] [address] [address] [place] [address] [phone]      As needed    Holland Moya MD  [address] [place] [address] [address]  [facility] [id number] [phone]    Schedule an appointment as soon as possible for a visit in 1 week           Medication List        New Prescriptions      HYDROcodone-acetaminophen  MG per tablet  Commonly known as: NORCO  Take 1 tablet by mouth Every 6 (Six) Hours As Needed for Moderate Pain or Severe Pain for up to 16 doses.     tamsulosin 0.4 MG capsule 24 hr capsule  Commonly known as: FLOMAX  Take 1 capsule by mouth Daily for 30 days.               Where to Get Your Medications        These medications were sent to [facility] [address] [place] [address]  [place] - [phone]  - [phone] FX  [address] [facility] [address]      Phone: [phone]   HYDROcodone-acetaminophen  MG per tablet  tamsulosin 0.4 MG capsule 24 hr capsule            Alvin Danielson MD  05/18/25 0044

## 2025-05-18 VITALS
BODY MASS INDEX: 35.21 KG/M2 | HEIGHT: 72 IN | TEMPERATURE: 98 F | HEART RATE: 72 BPM | WEIGHT: 260 LBS | SYSTOLIC BLOOD PRESSURE: 104 MMHG | RESPIRATION RATE: 16 BRPM | OXYGEN SATURATION: 95 % | DIASTOLIC BLOOD PRESSURE: 58 MMHG

## 2025-05-18 RX ORDER — HYDROCODONE BITARTRATE AND ACETAMINOPHEN 10; 325 MG/1; MG/1
1 TABLET ORAL EVERY 6 HOURS PRN
Qty: 16 TABLET | Refills: 0 | Status: SHIPPED | OUTPATIENT
Start: 2025-05-18

## 2025-05-18 RX ORDER — TAMSULOSIN HYDROCHLORIDE 0.4 MG/1
1 CAPSULE ORAL DAILY
Qty: 30 CAPSULE | Refills: 0 | Status: SHIPPED | OUTPATIENT
Start: 2025-05-18 | End: 2025-06-17

## (undated) DEVICE — GW ZIPWIRE STD/SHFT STR TPR/3CM .035IN 150CM

## (undated) DEVICE — URETERAL ACCESS SHEATH SET: Brand: NAVIGATOR

## (undated) DEVICE — FIBR LASR FLEXIVAPULSE365 HOLMIUM FLT/TP 1P/U

## (undated) DEVICE — NITINOL STONE RETRIEVAL BASKET: Brand: ZERO TIP

## (undated) DEVICE — COR CYSTO: Brand: MEDLINE INDUSTRIES, INC.